# Patient Record
Sex: FEMALE | Race: WHITE | NOT HISPANIC OR LATINO | Employment: FULL TIME | URBAN - METROPOLITAN AREA
[De-identification: names, ages, dates, MRNs, and addresses within clinical notes are randomized per-mention and may not be internally consistent; named-entity substitution may affect disease eponyms.]

---

## 2017-01-10 ENCOUNTER — OFFICE VISIT (OUTPATIENT)
Dept: SURGERY | Facility: CLINIC | Age: 52
End: 2017-01-10

## 2017-01-10 VITALS
HEIGHT: 62 IN | DIASTOLIC BLOOD PRESSURE: 60 MMHG | WEIGHT: 129.5 LBS | BODY MASS INDEX: 23.83 KG/M2 | OXYGEN SATURATION: 98 % | HEART RATE: 68 BPM | SYSTOLIC BLOOD PRESSURE: 100 MMHG

## 2017-01-10 DIAGNOSIS — Z48.89 POSTOPERATIVE VISIT: Primary | ICD-10-CM

## 2017-01-10 PROCEDURE — 99024 POSTOP FOLLOW-UP VISIT: CPT | Performed by: SURGERY

## 2017-01-10 RX ORDER — ONDANSETRON 4 MG/1
4 TABLET, FILM COATED ORAL EVERY 6 HOURS PRN
Qty: 20 TABLET | Refills: 0 | Status: SHIPPED | OUTPATIENT
Start: 2017-01-10 | End: 2022-03-01

## 2017-01-10 RX ORDER — ESCITALOPRAM OXALATE 10 MG/1
20 TABLET ORAL DAILY
COMMUNITY
Start: 2017-01-08

## 2017-01-10 NOTE — MR AVS SNAPSHOT
Twila Allison   1/10/2017 2:20 PM   Office Visit    Dept Phone:  [phone]   Encounter #:  [id number]    Provider:  Sebastian Hernandez Jr., MD   Department:  [facility] GENERAL SURGERY                Your Full Care Plan              Today's Medication Changes          These changes are accurate as of: 1/10/17  4:23 PM.  If you have any questions, ask your nurse or doctor.               New Medication(s)Ordered:     ondansetron 4 MG tablet   Commonly known as:  ZOFRAN   Take 1 tablet by mouth Every 6 (Six) Hours As Needed for nausea or vomiting for up to 20 doses.   Started by:  Sebasitan Hernandez Jr., MD         Stop taking medication(s)listed here:     azithromycin 250 MG tablet   Commonly known as:  ZITHROMAX   Stopped by:  Sebastian Hernandez Jr., MD           Ibuprofen 200 MG capsule   Stopped by:  Sebastian Hernandez Jr., MD           NYQUIL PO   Stopped by:  Sebastian Hernandez Jr., MD           oxyCODONE-acetaminophen 5-325 MG per tablet   Commonly known as:  PERCOCET   Stopped by:  Sebastian Hernandez Jr., MD                Where to Get Your Medications      These medications were sent to [facility]/pharmacy #[id number] - [facility], [place] - [address] AT [facility] [phone] [facility] [phone]   [address], [facility] [address]     Phone:  [phone]     ondansetron 4 MG tablet                  Your Updated Medication List          This list is accurate as of: 1/10/17  4:23 PM.  Always use your most recent med list.                escitalopram 10 MG tablet   Commonly known as:  LEXAPRO       ondansetron 4 MG tablet   Commonly known as:  ZOFRAN   Take 1 tablet by mouth Every 6 (Six) Hours As Needed for nausea or vomiting for up to 20 doses.               You Were Diagnosed With        Codes Comments    Postoperative visit    -  Primary ICD-10-CM: Z48.89  ICD-9-CM: V58.49       Instructions     None    Patient Instructions History      Upcoming Appointments   "   Visit Type Date Time Department    POST-OP 1/10/2017  2:20 PM [facility]      [employer] Signup     [facility] [employer] allows you to send messages to your doctor, view your test results, renew your prescriptions, schedule appointments, and more. To sign up, go to [employer] and click on the Sign Up Now link in the New User? box. Enter your [employer] Activation Code exactly as it appears below along with the last four digits of your Social Security Number and your Date of Birth () to complete the sign-up process. If you do not sign up before the expiration date, you must request a new code.    [employer] Activation Code: [email or web address]  Expires: 2017  5:43 AM    If you have questions, you can email [email or web address] or call [phone] to talk to our [employer] staff. Remember, [employer] is NOT to be used for urgent needs. For medical emergencies, dial 911.               Other Info from Your Visit           Allergies     Demerol [Meperidine] Intolerance GI Intolerance      Reason for Visit     Post-op appendectomy      Vital Signs     Blood Pressure Pulse Height Weight Oxygen Saturation Body Mass Index    100/60 68 62\" (157.5 cm) 129 lb 8 oz (58.7 kg) 98% 23.69 kg/m2    Smoking Status                   Never Smoker           Problems and Diagnoses Noted     Postoperative visit    -  Primary        "

## 2017-01-10 NOTE — PROGRESS NOTES
Subjective   Twila Allison is a 51 y.o. female who returns to the office after undergoing a laparoscopic appendectomy.     History of Present Illness     The patient is recovering well with no significant postop symptoms.  She is having no abdominal pain.  She has a good appetite and normal bowel function.  Her energy level is good.      Review of Systems   Constitutional: Negative for fatigue and fever.   Gastrointestinal: Negative for abdominal pain, constipation, diarrhea and nausea.   Skin: Negative for rash and wound.       Objective   Physical Exam   Constitutional:  Non-toxic appearance. She does not appear ill. No distress.   Abdominal: Soft. Normal appearance. There is no tenderness.   Neurological: She is alert.   Skin:   Incision: intact with no evidence of infection.   Psychiatric: She has a normal mood and affect. Her behavior is normal.       Assessment/Plan   The encounter diagnosis was Postoperative visit.  The patient is recovering well from her laparoscopic appendectomy.  At this point she has no limitations. She will follow-up on an as needed basis.

## 2018-02-19 ENCOUNTER — APPOINTMENT (OUTPATIENT)
Dept: WOMENS IMAGING | Facility: HOSPITAL | Age: 53
End: 2018-02-19

## 2018-02-19 PROCEDURE — 77067 SCR MAMMO BI INCL CAD: CPT | Performed by: RADIOLOGY

## 2018-03-24 DIAGNOSIS — Z12.11 ENCOUNTER FOR SCREENING FOR MALIGNANT NEOPLASM OF COLON: Primary | ICD-10-CM

## 2018-03-24 RX ORDER — SODIUM CHLORIDE, SODIUM LACTATE, POTASSIUM CHLORIDE, CALCIUM CHLORIDE 600; 310; 30; 20 MG/100ML; MG/100ML; MG/100ML; MG/100ML
30 INJECTION, SOLUTION INTRAVENOUS CONTINUOUS
Status: CANCELLED | OUTPATIENT
Start: 2018-04-13

## 2018-04-02 PROBLEM — Z12.11 ENCOUNTER FOR SCREENING FOR MALIGNANT NEOPLASM OF COLON: Status: ACTIVE | Noted: 2018-04-02

## 2018-04-13 ENCOUNTER — ANESTHESIA EVENT (OUTPATIENT)
Dept: GASTROENTEROLOGY | Facility: HOSPITAL | Age: 53
End: 2018-04-13

## 2018-04-13 ENCOUNTER — ANESTHESIA (OUTPATIENT)
Dept: GASTROENTEROLOGY | Facility: HOSPITAL | Age: 53
End: 2018-04-13

## 2018-04-13 ENCOUNTER — HOSPITAL ENCOUNTER (OUTPATIENT)
Facility: HOSPITAL | Age: 53
Setting detail: HOSPITAL OUTPATIENT SURGERY
Discharge: HOME | End: 2018-04-13
Attending: INTERNAL MEDICINE | Admitting: INTERNAL MEDICINE

## 2018-04-13 VITALS
WEIGHT: 128.5 LBS | BODY MASS INDEX: 23.65 KG/M2 | RESPIRATION RATE: 16 BRPM | OXYGEN SATURATION: 100 % | TEMPERATURE: 97.6 F | SYSTOLIC BLOOD PRESSURE: 126 MMHG | HEART RATE: 58 BPM | DIASTOLIC BLOOD PRESSURE: 78 MMHG | HEIGHT: 62 IN

## 2018-04-13 DIAGNOSIS — Z12.11 ENCOUNTER FOR SCREENING FOR MALIGNANT NEOPLASM OF COLON: ICD-10-CM

## 2018-04-13 PROCEDURE — 88305 TISSUE EXAM BY PATHOLOGIST: CPT | Performed by: INTERNAL MEDICINE

## 2018-04-13 PROCEDURE — 45380 COLONOSCOPY AND BIOPSY: CPT | Performed by: INTERNAL MEDICINE

## 2018-04-13 PROCEDURE — 25010000002 PROPOFOL 10 MG/ML EMULSION: Performed by: ANESTHESIOLOGY

## 2018-04-13 PROCEDURE — S0260 H&P FOR SURGERY: HCPCS | Performed by: INTERNAL MEDICINE

## 2018-04-13 RX ORDER — LIDOCAINE HYDROCHLORIDE 20 MG/ML
INJECTION, SOLUTION INFILTRATION; PERINEURAL AS NEEDED
Status: DISCONTINUED | OUTPATIENT
Start: 2018-04-13 | End: 2018-04-13 | Stop reason: SURG

## 2018-04-13 RX ORDER — PROPOFOL 10 MG/ML
VIAL (ML) INTRAVENOUS CONTINUOUS PRN
Status: DISCONTINUED | OUTPATIENT
Start: 2018-04-13 | End: 2018-04-13 | Stop reason: SURG

## 2018-04-13 RX ORDER — PROPOFOL 10 MG/ML
VIAL (ML) INTRAVENOUS AS NEEDED
Status: DISCONTINUED | OUTPATIENT
Start: 2018-04-13 | End: 2018-04-13 | Stop reason: SURG

## 2018-04-13 RX ORDER — SODIUM CHLORIDE, SODIUM LACTATE, POTASSIUM CHLORIDE, CALCIUM CHLORIDE 600; 310; 30; 20 MG/100ML; MG/100ML; MG/100ML; MG/100ML
30 INJECTION, SOLUTION INTRAVENOUS CONTINUOUS
Status: DISCONTINUED | OUTPATIENT
Start: 2018-04-13 | End: 2018-04-13 | Stop reason: HOSPADM

## 2018-04-13 RX ADMIN — LIDOCAINE HYDROCHLORIDE 40 MG: 20 INJECTION, SOLUTION INFILTRATION; PERINEURAL at 08:39

## 2018-04-13 RX ADMIN — PROPOFOL 50 MG: 10 INJECTION, EMULSION INTRAVENOUS at 08:42

## 2018-04-13 RX ADMIN — PROPOFOL 140 MCG/KG/MIN: 10 INJECTION, EMULSION INTRAVENOUS at 08:43

## 2018-04-13 RX ADMIN — SODIUM CHLORIDE, POTASSIUM CHLORIDE, SODIUM LACTATE AND CALCIUM CHLORIDE: 600; 310; 30; 20 INJECTION, SOLUTION INTRAVENOUS at 08:37

## 2018-04-13 RX ADMIN — SODIUM CHLORIDE, POTASSIUM CHLORIDE, SODIUM LACTATE AND CALCIUM CHLORIDE 30 ML/HR: 600; 310; 30; 20 INJECTION, SOLUTION INTRAVENOUS at 08:03

## 2018-04-13 NOTE — H&P
"[facility]  Pre Procedure History & Physical    Chief Complaint: colon cancer screening      HPI: 54yo W with PMH as below here for screening colonoscopy.  No family history of GI malignancies nor colon polyps.  Denies blood in stool, change in bowel habits, abdominal pain.  Otherwise feels well.        Past Medical History:   Past Medical History:   Diagnosis Date   • Anxiety        Family History:  Family History   Problem Relation Age of Onset   • No Known Problems Mother    • Heart disease Father    • No Known Problems Daughter    • No Known Problems Daughter    • Malig Hyperthermia Neg Hx        Social History:   reports that she has never smoked. She has never used smokeless tobacco. She reports that she drinks about 1.8 oz of alcohol per week . Drug use questions deferred to the physician.    Medications:   Prescriptions Prior to Admission   Medication Sig Dispense Refill Last Dose   • escitalopram (LEXAPRO) 10 MG tablet Take 10 mg by mouth Daily.      • ondansetron (ZOFRAN) 4 MG tablet Take 1 tablet by mouth Every 6 (Six) Hours As Needed for nausea or vomiting for up to 20 doses. 20 tablet 0        Allergies:  Demerol [meperidine]    ROS:    Pertinent items are noted in HPI     Objective     Height 157.4 cm (61.97\").    Physical Exam   Constitutional: Pt is oriented to person, place, and time and well-developed, well-nourished, and in no distress.   HENT:   Mouth/Throat: Oropharynx is clear and moist.   Neck: Normal range of motion. Neck supple.   Cardiovascular: Normal rate, regular rhythm and normal heart sounds.    Pulmonary/Chest: Effort normal and breath sounds normal. No respiratory distress. No  wheezes.   Abdominal: Soft. Bowel sounds are normal.   Skin: Skin is warm and dry.   Psychiatric: Mood, memory, affect and judgment normal.     Assessment/Plan     Diagnosis: colon cancer screening        Anticipated Surgical Procedure:  Colonoscopy    The risks, benefits, and " alternatives of this procedure have been discussed with the patient or the responsible party- the patient understands and agrees to proceed.

## 2018-04-13 NOTE — ANESTHESIA PREPROCEDURE EVALUATION
Anesthesia Evaluation     Patient summary reviewed and Nursing notes reviewed                Airway   Mallampati: II  TM distance: >3 FB  Neck ROM: full  Dental - normal exam     Pulmonary - negative pulmonary ROS and normal exam   Cardiovascular - negative cardio ROS and normal exam        Neuro/Psych- negative ROS  GI/Hepatic/Renal/Endo - negative ROS     Musculoskeletal (-) negative ROS    Abdominal    Substance History - negative use     OB/GYN negative ob/gyn ROS         Other                        Anesthesia Plan    ASA 1     MAC     Anesthetic plan and risks discussed with patient.

## 2018-04-13 NOTE — ANESTHESIA POSTPROCEDURE EVALUATION
"Patient: Twila Allison    Procedure Summary     Date:  04/13/18 Room / Location:  [facility] ENDOSCOPY 9 /  [facility]    Anesthesia Start:  0837 Anesthesia Stop:  0913    Procedure:  COLONOSCOPY into cecum and TI with cold biopsy polypectomies (N/A ) Diagnosis:       Encounter for screening for malignant neoplasm of colon      (Encounter for screening for malignant neoplasm of colon [Z12.11])    Surgeon:  Roselyn Leung MD Provider:  Phillip Yanez MD    Anesthesia Type:  MAC ASA Status:  1          Anesthesia Type: MAC  Last vitals  BP   110/75 (04/13/18 0751)   Temp   36.8 °C (98.2 °F) (04/13/18 0751)   Pulse   66 (04/13/18 0751)   Resp   10 (04/13/18 0751)     SpO2   100 % (04/13/18 0751)     Post Anesthesia Care and Evaluation    Patient location during evaluation: bedside  Patient participation: complete - patient participated  Level of consciousness: awake  Pain score: 2  Pain management: adequate  Airway patency: patent  Anesthetic complications: No anesthetic complications  PONV Status: none  Cardiovascular status: acceptable  Respiratory status: acceptable  Hydration status: acceptable    Comments: /75 (BP Location: Left arm, Patient Position: Lying)   Pulse 66   Temp 36.8 °C (98.2 °F) (Oral)   Resp 10   Ht 157.5 cm (62\")   Wt 58.3 kg (128 lb 8 oz)   SpO2 100%   BMI 23.50 kg/m²         "

## 2018-04-16 LAB
CYTO UR: NORMAL
LAB AP CASE REPORT: NORMAL
Lab: NORMAL
PATH REPORT.FINAL DX SPEC: NORMAL
PATH REPORT.GROSS SPEC: NORMAL

## 2018-04-17 NOTE — PROGRESS NOTES
The 2 polyps removed from her colon were tubular adenomas.  In absence of symptoms, her next colonoscopy should be in 5 years.  Please place in recall.

## 2018-05-17 ENCOUNTER — TELEPHONE (OUTPATIENT)
Dept: GASTROENTEROLOGY | Facility: CLINIC | Age: 53
End: 2018-05-17

## 2018-05-17 NOTE — TELEPHONE ENCOUNTER
----- Message from Roselyn Leung MD sent at 4/17/2018  1:19 PM EDT -----  The 2 polyps removed from her colon were tubular adenomas.  In absence of symptoms, her next colonoscopy should be in 5 years.  Please place in recall.

## 2018-05-18 NOTE — TELEPHONE ENCOUNTER
Called pt and advised per Dr Leung that her 2 polyps removed were not cancerous but were precancerous tubular adenomas.  In the absence of symptoms , she recommends a repeat c/s in 5 yrs. Pt verb understanding.

## 2020-01-24 ENCOUNTER — APPOINTMENT (OUTPATIENT)
Dept: WOMENS IMAGING | Facility: HOSPITAL | Age: 55
End: 2020-01-24

## 2020-01-24 PROCEDURE — 77067 SCR MAMMO BI INCL CAD: CPT | Performed by: RADIOLOGY

## 2022-03-01 ENCOUNTER — OFFICE VISIT (OUTPATIENT)
Dept: FAMILY MEDICINE CLINIC | Facility: CLINIC | Age: 57
End: 2022-03-01

## 2022-03-01 VITALS
TEMPERATURE: 98.4 F | DIASTOLIC BLOOD PRESSURE: 76 MMHG | HEART RATE: 86 BPM | OXYGEN SATURATION: 96 % | HEIGHT: 62 IN | SYSTOLIC BLOOD PRESSURE: 126 MMHG | WEIGHT: 139.6 LBS | BODY MASS INDEX: 25.69 KG/M2

## 2022-03-01 DIAGNOSIS — Z13.220 ENCOUNTER FOR LIPID SCREENING FOR CARDIOVASCULAR DISEASE: ICD-10-CM

## 2022-03-01 DIAGNOSIS — Z13.6 ENCOUNTER FOR LIPID SCREENING FOR CARDIOVASCULAR DISEASE: ICD-10-CM

## 2022-03-01 DIAGNOSIS — F41.9 ANXIETY: ICD-10-CM

## 2022-03-01 DIAGNOSIS — K64.9 HEMORRHOIDS, UNSPECIFIED HEMORRHOID TYPE: ICD-10-CM

## 2022-03-01 DIAGNOSIS — K92.1 BLOOD IN STOOL: ICD-10-CM

## 2022-03-01 DIAGNOSIS — Z00.00 ENCOUNTER FOR ANNUAL PHYSICAL EXAM: Primary | ICD-10-CM

## 2022-03-01 DIAGNOSIS — K59.00 CONSTIPATION, UNSPECIFIED CONSTIPATION TYPE: ICD-10-CM

## 2022-03-01 PROCEDURE — 99386 PREV VISIT NEW AGE 40-64: CPT | Performed by: NURSE PRACTITIONER

## 2022-03-01 RX ORDER — CHLORAL HYDRATE 500 MG
1000 CAPSULE ORAL
COMMUNITY

## 2022-03-01 RX ORDER — B-COMPLEX WITH VITAMIN C
1 TABLET ORAL DAILY
COMMUNITY

## 2022-03-01 RX ORDER — HYDROCORTISONE 25 MG/G
CREAM TOPICAL
Qty: 28 G | Refills: 0 | Status: SHIPPED | OUTPATIENT
Start: 2022-03-01

## 2022-03-01 NOTE — PROGRESS NOTES
Subjective   Twila Allison is a 57 y.o. female.     Chief Complaint   Patient presents with   • Hemorrhoids     getting worse, slight bleeding painful annoying    • Annual Exam       History of Present Illness   New patient, here to establish care; no previous PCP; has been seeing GYN at [facility]; patient presents for CPE with non-fasting labs; healthy diet; regular exercise, 3-4 days per week, does strength training; regular dental visits; last eye exam last week; no problems hearing; immunizations: declines Tdap today and will check with insurance regarding Shingrix; sees LOUISE Jose for female care; last PAP about 6 months ago; last mammo about 1.5 years ago; no family history of breast cancer; colonoscopy 2018, repeat in 5 years; no family history of colon cancer.     Also, c/o hemorrhoids; has had problems with hemorrhoids since had kids; symptoms have been getting worse over the last year and more painful; has had slight bleeding; has noted bright red blood with wiping; has tried multiple OTC treatments and home remedies and have not helped; has tried apple cider vinegar, olive oil; has had ongoing constipation; stools are hard; takes Dulcolax as needed if has not had BM for few days; has BM about every 3-4 days; has tried MiraLax in past; had colonoscopy in 2018 per Dr. Leung GI.    F/U anxiety: takes Escitalopram daily and works well; no problems with sleep typically; no SI/HI.    The following portions of the patient's history were reviewed and updated as appropriate: allergies, current medications, past family history, past medical history, past social history, past surgical history and problem list.    Current Outpatient Medications on File Prior to Visit   Medication Sig   • B Complex Vitamins (Vitamin B Complex) tablet Take 1 tablet by mouth Daily.   • escitalopram (LEXAPRO) 10 MG tablet Take 10 mg by mouth Daily.   • Omega-3 Fatty Acids (fish oil) 1000 MG capsule capsule  Take 1,000 mg by mouth Daily With Breakfast.     No current facility-administered medications on file prior to visit.        Past Medical History:   Diagnosis Date   • Acute appendicitis with localized peritonitis 12/24/2016   • Anxiety        Past Surgical History:   Procedure Laterality Date   • APPENDECTOMY N/A 12/24/2016    Procedure: APPENDECTOMY LAPAROSCOPIC;  Surgeon: Sebastian Hernandez Jr., MD;  Location: [facility] MAIN OR;  Service:    • CHOLECYSTECTOMY  1999   • COLONOSCOPY N/A 4/13/2018    Procedure: COLONOSCOPY into cecum and TI with cold biopsy polypectomies;  Surgeon: Roselyn Leung MD;  Location: [facility] ENDOSCOPY;  Service: Gastroenterology   • LASIK  02/25/2022   • WRIST FUSION Left     2021       Family History   Problem Relation Age of Onset   • No Known Problems Mother    • Heart disease Father    • Heart attack Father 65        father passed in 2021 at age 81 years   • No Known Problems Daughter    • No Known Problems Daughter    • Pancreatic cancer Brother    • Lung cancer Brother    • Malig Hyperthermia Neg Hx        Social History     Socioeconomic History   • Marital status: Single   Tobacco Use   • Smoking status: Never Smoker   • Smokeless tobacco: Never Used   Vaping Use   • Vaping Use: Never used   Substance and Sexual Activity   • Alcohol use: Yes     Alcohol/week: 3.0 standard drinks     Types: 3 Cans of beer per week     Comment: a few times a year   • Drug use: Defer   • Sexual activity: Defer       Review of Systems   Constitutional: Negative for appetite change, chills, fatigue, fever and unexpected weight loss. Weight gain: has gained 15 pounds in the last year, no change in diet or exercise.   HENT: Negative for ear pain, sore throat and trouble swallowing. Sinus pressure: at times; takes OTC sinus med as needed and helps.    Eyes: Negative for blurred vision.   Respiratory: Negative for cough, chest tightness and shortness of breath.    Cardiovascular: Negative for chest pain,  "palpitations and leg swelling.   Gastrointestinal: Positive for blood in stool and constipation. Negative for abdominal pain, GERD and indigestion.   Endocrine: Negative for cold intolerance, heat intolerance and polydipsia.   Genitourinary: Negative for dysuria and frequency.   Musculoskeletal: Negative for arthralgias and back pain.   Skin: Negative for rash and skin lesions.   Neurological: Negative for dizziness, syncope, light-headedness and headache.   Hematological: Does not bruise/bleed easily.   Psychiatric/Behavioral: Negative for suicidal ideas.       Objective   Vitals:    03/01/22 1515   BP: 126/76   BP Location: Left arm   Patient Position: Sitting   Cuff Size: Adult   Pulse: 86   Temp: 98.4 °F (36.9 °C)   SpO2: 96%   Weight: 63.3 kg (139 lb 9.6 oz)   Height: 157.5 cm (62\")     Body mass index is 25.53 kg/m².    Physical Exam  Vitals and nursing note reviewed.   Constitutional:       General: She is not in acute distress.     Appearance: She is well-developed and well-groomed. She is not ill-appearing or diaphoretic.   HENT:      Head: Normocephalic and atraumatic.      Jaw: No tenderness or pain on movement.      Right Ear: Tympanic membrane and external ear normal. No decreased hearing noted.      Left Ear: Tympanic membrane and external ear normal. No decreased hearing noted.      Nose: Nose normal.      Right Sinus: No maxillary sinus tenderness or frontal sinus tenderness.      Left Sinus: No maxillary sinus tenderness or frontal sinus tenderness.      Mouth/Throat:      Mouth: Mucous membranes are moist.      Pharynx: No oropharyngeal exudate or posterior oropharyngeal erythema.   Eyes:      Extraocular Movements: Extraocular movements intact.      Conjunctiva/sclera: Conjunctivae normal.      Pupils: Pupils are equal, round, and reactive to light.   Neck:      Thyroid: No thyromegaly.      Vascular: No carotid bruit.      Trachea: No tracheal deviation.   Cardiovascular:      Rate and Rhythm: " Normal rate and regular rhythm.      Pulses: Normal pulses.      Heart sounds: Normal heart sounds. No murmur heard.      Pulmonary:      Effort: Pulmonary effort is normal. No respiratory distress.      Breath sounds: Normal breath sounds.   Abdominal:      General: Bowel sounds are normal.      Palpations: Abdomen is soft. There is no hepatomegaly or splenomegaly.      Tenderness: There is no abdominal tenderness. There is no guarding.   Genitourinary:     Comments: Pt declined exam of anus; would like referral.  Musculoskeletal:         General: Normal range of motion.      Cervical back: Normal range of motion and neck supple. No bony tenderness.      Thoracic back: No bony tenderness.      Lumbar back: No bony tenderness.      Right lower leg: No edema.      Left lower leg: No edema.   Lymphadenopathy:      Cervical: No cervical adenopathy.   Skin:     General: Skin is warm and dry.      Findings: No rash.   Neurological:      Mental Status: She is alert and oriented to person, place, and time.      Cranial Nerves: No cranial nerve deficit.      Motor: Motor function is intact.      Coordination: Coordination normal.      Gait: Gait normal.      Deep Tendon Reflexes: Reflexes are normal and symmetric.   Psychiatric:         Mood and Affect: Mood normal.         Behavior: Behavior normal.         Thought Content: Thought content normal.         Cognition and Memory: Cognition normal.         Judgment: Judgment normal.         Lab Results   Component Value Date    WBC 9.82 12/25/2016    RBC 3.90 12/25/2016    HGB 11.7 (L) 12/25/2016    HCT 38.1 12/25/2016    MCV 97.7 12/25/2016    MCH 30.0 12/25/2016    MCHC 30.7 (L) 12/25/2016    RDW 13.0 12/25/2016    RDWSD 46.3 12/25/2016    MPV 11.2 12/25/2016     12/25/2016    NEUTRORELPCT 85.7 (H) 12/25/2016    LYMPHORELPCT 8.2 (L) 12/25/2016    MONORELPCT 5.8 12/25/2016    EOSRELPCT 0.0 (L) 12/25/2016    BASORELPCT 0.1 12/25/2016    AUTOIGPER 0.2 12/25/2016     NEUTROABS 8.41 (H) 12/25/2016    LYMPHSABS 0.81 (L) 12/25/2016    MONOSABS 0.57 12/25/2016    EOSABS 0.00 12/25/2016    BASOSABS 0.01 12/25/2016    AUTOIGNUM 0.02 12/25/2016     Lab Results   Component Value Date    GLUCOSE 143 (H) 12/25/2016    BUN 7 12/25/2016    CREATININE 0.61 12/25/2016    EGFRIFNONA 104 12/25/2016    BCR 11.5 12/25/2016    K 4.3 12/25/2016    CO2 26.0 12/25/2016    CALCIUM 9.1 12/25/2016    ALBUMIN 4.20 12/24/2016    AST 44 (H) 12/24/2016    ALT 43 (H) 12/24/2016      Lab Results   Component Value Date    CHLPL 138 02/17/2014    TRIG 50 02/17/2014    HDL 69 02/17/2014    VLDL 10 02/17/2014    LDL 60 02/17/2014     Lab Results   Component Value Date    TSH 1.26 02/17/2014     Lab Results   Component Value Date    RBCUA 3-5 (A) 12/24/2016    BACTERIA None Seen 12/24/2016    COLORU Yellow 12/24/2016    CLARITYU Clear 12/24/2016    LEUKOCYTESUR Negative 12/24/2016    GLUCOSEU Negative 12/24/2016    BLOODU Negative 12/24/2016    BILIRUBINUR Negative 12/24/2016    NITRITEU Negative 12/24/2016        Assessment/Plan .  Problem List Items Addressed This Visit     Anxiety    Current Assessment & Plan     Stable on Escitalopram daily.  Managed per GYN.         Hemorrhoids    Relevant Medications    Hydrocortisone, Perianal, (ANUSOL-HC) 2.5 % rectal cream    Other Relevant Orders    Ambulatory Referral to Colorectal Surgery    Constipation    Current Assessment & Plan     Continue increased intake of clear liquids and fiber.  Consider MiraLax daily until loose bowel movements.         Relevant Orders    Comprehensive Metabolic Panel    TSH Rfx On Abnormal To Free T4    Blood in stool    Relevant Orders    CBC & Differential    Ambulatory Referral to Colorectal Surgery      Other Visit Diagnoses     Encounter for annual physical exam    -  Primary    Relevant Orders    CBC & Differential    Lipid Panel With LDL / HDL Ratio    Comprehensive Metabolic Panel    TSH Rfx On Abnormal To Free T4    Encounter for  lipid screening for cardiovascular disease        Relevant Orders    Lipid Panel With LDL / HDL Ratio          Return if symptoms worsen or fail to improve.  Impression: Health maintenance visit.  Currently, eats a healthy diet and has a regular exercise routine.  Cervical cancer screening: see GYN.  Breast cancer screening: due for repeat mammo at GYN.  Colorectal cancer screening: UTD.  Screening lab work includes: CMP, lipid.  Immunizations: UTD; risks and benefits of immunizations were discussed with patient.  Advice and education were given regarding nutrition and aerobic exercise.         COVID-19 Precautions - Patient was compliant in wearing a mask. When I saw the patient, I used appropriate personal protective equipment (PPE) including mask, gloves, and eye shield (standard procedure).  Hand hygiene was completed before and after seeing the patient.   Cimetidine Pregnancy And Lactation Text: This medication is Pregnancy Category B and is considered safe during pregnancy. It is also excreted in breast milk and breast feeding isn't recommended.

## 2022-03-01 NOTE — PATIENT INSTRUCTIONS
Continue healthy diet and exercise.  Follow up pending lab results.  Follow up if symptoms persist or worsen.

## 2022-03-02 PROBLEM — K59.00 CONSTIPATION: Status: ACTIVE | Noted: 2022-03-02

## 2022-03-02 PROBLEM — K64.9 HEMORRHOIDS: Status: ACTIVE | Noted: 2022-03-02

## 2022-03-02 PROBLEM — K92.1 BLOOD IN STOOL: Status: ACTIVE | Noted: 2022-03-02

## 2022-03-02 LAB
ALBUMIN SERPL-MCNC: 4.6 G/DL (ref 3.8–4.9)
ALBUMIN/GLOB SERPL: 1.8 {RATIO} (ref 1.2–2.2)
ALP SERPL-CCNC: 87 IU/L (ref 44–121)
ALT SERPL-CCNC: 22 IU/L (ref 0–32)
AST SERPL-CCNC: 25 IU/L (ref 0–40)
BASOPHILS # BLD AUTO: 0.1 X10E3/UL (ref 0–0.2)
BASOPHILS NFR BLD AUTO: 1 %
BILIRUB SERPL-MCNC: 0.8 MG/DL (ref 0–1.2)
BUN SERPL-MCNC: 12 MG/DL (ref 6–24)
BUN/CREAT SERPL: 14 (ref 9–23)
CALCIUM SERPL-MCNC: 9.8 MG/DL (ref 8.7–10.2)
CHLORIDE SERPL-SCNC: 101 MMOL/L (ref 96–106)
CHOLEST SERPL-MCNC: 214 MG/DL (ref 100–199)
CO2 SERPL-SCNC: 24 MMOL/L (ref 20–29)
CREAT SERPL-MCNC: 0.84 MG/DL (ref 0.57–1)
EGFR GENE MUT ANL BLD/T: 82 ML/MIN/1.73
EOSINOPHIL # BLD AUTO: 0.1 X10E3/UL (ref 0–0.4)
EOSINOPHIL NFR BLD AUTO: 2 %
ERYTHROCYTE [DISTWIDTH] IN BLOOD BY AUTOMATED COUNT: 12.2 % (ref 11.7–15.4)
GLOBULIN SER CALC-MCNC: 2.5 G/DL (ref 1.5–4.5)
GLUCOSE SERPL-MCNC: 86 MG/DL (ref 65–99)
HCT VFR BLD AUTO: 40.6 % (ref 34–46.6)
HDLC SERPL-MCNC: 103 MG/DL
HGB BLD-MCNC: 13.4 G/DL (ref 11.1–15.9)
IMM GRANULOCYTES # BLD AUTO: 0 X10E3/UL (ref 0–0.1)
IMM GRANULOCYTES NFR BLD AUTO: 0 %
LDLC SERPL CALC-MCNC: 103 MG/DL (ref 0–99)
LDLC/HDLC SERPL: 1 RATIO (ref 0–3.2)
LYMPHOCYTES # BLD AUTO: 2 X10E3/UL (ref 0.7–3.1)
LYMPHOCYTES NFR BLD AUTO: 35 %
MCH RBC QN AUTO: 29.9 PG (ref 26.6–33)
MCHC RBC AUTO-ENTMCNC: 33 G/DL (ref 31.5–35.7)
MCV RBC AUTO: 91 FL (ref 79–97)
MONOCYTES # BLD AUTO: 0.5 X10E3/UL (ref 0.1–0.9)
MONOCYTES NFR BLD AUTO: 8 %
NEUTROPHILS # BLD AUTO: 3.2 X10E3/UL (ref 1.4–7)
NEUTROPHILS NFR BLD AUTO: 54 %
PLATELET # BLD AUTO: 295 X10E3/UL (ref 150–450)
POTASSIUM SERPL-SCNC: 4.6 MMOL/L (ref 3.5–5.2)
PROT SERPL-MCNC: 7.1 G/DL (ref 6–8.5)
RBC # BLD AUTO: 4.48 X10E6/UL (ref 3.77–5.28)
SODIUM SERPL-SCNC: 140 MMOL/L (ref 134–144)
TRIGL SERPL-MCNC: 45 MG/DL (ref 0–149)
TSH SERPL DL<=0.005 MIU/L-ACNC: 1.97 UIU/ML (ref 0.45–4.5)
VLDLC SERPL CALC-MCNC: 8 MG/DL (ref 5–40)
WBC # BLD AUTO: 5.9 X10E3/UL (ref 3.4–10.8)

## 2022-03-02 NOTE — ASSESSMENT & PLAN NOTE
Continue increased intake of clear liquids and fiber.  Consider MiraLax daily until loose bowel movements.

## 2022-03-07 ENCOUNTER — OFFICE VISIT (OUTPATIENT)
Dept: SURGERY | Facility: CLINIC | Age: 57
End: 2022-03-07

## 2022-03-07 VITALS
HEART RATE: 80 BPM | OXYGEN SATURATION: 98 % | DIASTOLIC BLOOD PRESSURE: 86 MMHG | WEIGHT: 135.2 LBS | SYSTOLIC BLOOD PRESSURE: 110 MMHG | BODY MASS INDEX: 24.88 KG/M2 | HEIGHT: 62 IN | TEMPERATURE: 97.4 F

## 2022-03-07 DIAGNOSIS — K64.4 ANAL SKIN TAG: Primary | ICD-10-CM

## 2022-03-07 DIAGNOSIS — K64.8 INTERNAL HEMORRHOIDS: ICD-10-CM

## 2022-03-07 PROCEDURE — 46600 DIAGNOSTIC ANOSCOPY SPX: CPT | Performed by: PHYSICIAN ASSISTANT

## 2022-03-07 PROCEDURE — 99214 OFFICE O/P EST MOD 30 MIN: CPT | Performed by: PHYSICIAN ASSISTANT

## 2022-03-07 NOTE — PROGRESS NOTES
Twila Allison is a 57 y.o. female who is seen as a consult at the request of LOUISE Norris for Hemorrhoids and Rectal Bleeding.      HPI:  Pt presents today for evaluation of hemorrhoids.   Intermittent flare ups for years. Worsening symptoms over the past year.   Burning sensation during workouts.   Occasional RB.  Was seen by her PCP 03/01/2022 and given Rx for Anusol-HC 2.5% rectal cream. Applying internally and externally without improvement.     Irregular BMs.   Chronic constipation   BM every 3-4 days. Normal per Pt  [place]: 1  Straining with BMs  Increased PO hydration and tries to eat high fiber diet  Tried taking fiber supplements and Miralax in the past without improvement.   Denies trying Linzess, Amitiza, or Trulance in the past.     Last colonoscopy in 2018. Tubular adenomas x2.   No known FHx of colon polyps or colon cancer.     Past Medical History:   Diagnosis Date   • Acute appendicitis with localized peritonitis 12/24/2016   • Anxiety        Past Surgical History:   Procedure Laterality Date   • APPENDECTOMY N/A 12/24/2016    Procedure: APPENDECTOMY LAPAROSCOPIC;  Surgeon: Sebastian Hernandez Jr., MD;  Location: [facility] MAIN OR;  Service:    • CHOLECYSTECTOMY N/A 1999   • COLONOSCOPY N/A 04/13/2018    Procedure: COLONOSCOPY into cecum and TI with cold biopsy polypectomies;  Surgeon: Roselyn Leung MD;  Location: [facility] ENDOSCOPY;  Service: Gastroenterology   • LASIK Bilateral 02/25/2022   • WRIST FUSION Left 2021       Social History:   reports that she has never smoked. She has never used smokeless tobacco. She reports current alcohol use of about 3.0 standard drinks of alcohol per week. Drug use questions deferred to the physician.      Marriage status: Single    Family History   Problem Relation Age of Onset   • No Known Problems Mother    • Hypertension Father    • Heart disease Father    • Heart attack Father 65        father passed in 2021 at age 81 years   • Cancer Brother    •  Pancreatic cancer Brother    • Cancer Brother    • Lung cancer Brother    • No Known Problems Daughter    • No Known Problems Daughter    • Malig Hyperthermia Neg Hx          Current Outpatient Medications:   •  escitalopram (LEXAPRO) 10 MG tablet, Take 20 mg by mouth Daily., Disp: , Rfl:   •  B Complex Vitamins (Vitamin B Complex) tablet, Take 1 tablet by mouth Daily., Disp: , Rfl:   •  Hydrocortisone, Perianal, (ANUSOL-HC) 2.5 % rectal cream, Apply externally to hemorrhoids twice daily ., Disp: 28 g, Rfl: 0  •  linaclotide (Linzess) 145 MCG capsule capsule, Take 1 capsule by mouth Every Morning Before Breakfast., Disp: 30 capsule, Rfl: 3  •  Omega-3 Fatty Acids (fish oil) 1000 MG capsule capsule, Take 1,000 mg by mouth Daily With Breakfast., Disp: , Rfl:     Allergy  Demerol [meperidine]    Review of Systems   Constitutional: Negative for decreased appetite and weight gain.   HENT: Negative for congestion, hearing loss and hoarse voice.    Eyes: Negative for blurred vision, discharge and visual disturbance.   Cardiovascular: Negative for chest pain, cyanosis and leg swelling.   Respiratory: Negative for cough, shortness of breath, sleep disturbances due to breathing and snoring.    Endocrine: Negative for cold intolerance and heat intolerance.   Hematologic/Lymphatic: Does not bruise/bleed easily.   Skin: Negative for itching, poor wound healing and skin cancer.   Musculoskeletal: Negative for arthritis, back pain, joint pain and joint swelling.   Gastrointestinal: Negative for abdominal pain, change in bowel habit, bowel incontinence and constipation.   Genitourinary: Negative for bladder incontinence, dysuria and hematuria.   Neurological: Negative for brief paralysis, excessive daytime sleepiness, dizziness, focal weakness, headaches, light-headedness and weakness.   Psychiatric/Behavioral: Negative for altered mental status and hallucinations. The patient does not have insomnia.    Allergic/Immunologic:  Negative for HIV exposure and persistent infections.   All other systems reviewed and are negative.      Vitals:    03/07/22 1034   BP: 110/86   Pulse: 80   Temp: 97.4 °F (36.3 °C)   SpO2: 98%     Body mass index is 24.73 kg/m².    Physical Exam  Exam conducted with a chaperone present.   Constitutional:       General: She is not in acute distress.     Appearance: She is well-developed.   HENT:      Head: Normocephalic and atraumatic.      Nose: Nose normal.   Eyes:      Conjunctiva/sclera: Conjunctivae normal.      Pupils: Pupils are equal, round, and reactive to light.   Neck:      Trachea: No tracheal deviation.   Pulmonary:      Effort: Pulmonary effort is normal. No respiratory distress.      Breath sounds: Normal breath sounds.   Abdominal:      General: Bowel sounds are normal. There is no distension.      Palpations: Abdomen is soft.   Genitourinary:     Comments: Perianal exam: Anal skin tags  FABIAN- Good tone, no masses  Anoscopy performed:  Grade 2 x 2 internal hem   Musculoskeletal:         General: No deformity. Normal range of motion.      Cervical back: Normal range of motion.   Skin:     General: Skin is warm and dry.   Neurological:      Mental Status: She is alert and oriented to person, place, and time.      Cranial Nerves: No cranial nerve deficit.      Coordination: Coordination normal.      Gait: Gait normal.   Psychiatric:         Behavior: Behavior normal.         Judgment: Judgment normal.         Review of Medical Record:  I reviewed PMHx, Surgical Hx, FHx, and Medication List    Colonoscopy 04/13/2018  - Hemorrhoids on perianal exam  - Non-Bleeding Internal Hemorrhoids  - 3 mm Tubular Adenoma with Low-Grade Dysplasia, Cecum  - 4 mm Tubular Adenoma with Low-Grade Dysplasia, Ascending Colon  - Diverticulosis, Sigmoid  - Repeat 5 years  - Dr. Leung, [facility]    Assessment:  1. Anal skin tag    2. Internal hemorrhoids    - New     Plan:  - Discussed with Pt the limitations of removing anal skin  tags. The scar tissue post anal skin tag excision will likely be comparable in size to the current skin tags and therefore would not yield desirable results.  Pt expresses understanding.   - Discussed with Pt common causes of hemorrhoidal irritation and enlargement. Discussed with Pt that treating her constipation could prevent future hemorrhoidal irritation and thus prevent worsening of the anal skin tags.   - Start Linzess 145 mcg QD  - Continue Anusol-HC 2.5% rectal cream PRN for hemorrhoidal discomfort  - Follow up in 6-8 weeks for reevaluation

## 2022-03-08 ENCOUNTER — TELEPHONE (OUTPATIENT)
Dept: SURGERY | Facility: CLINIC | Age: 57
End: 2022-03-08

## 2022-03-08 NOTE — TELEPHONE ENCOUNTER
Pt says she was prescribed Linzess yesterday and it was sent to [facility], pt wants to know if it can be sent to [facility] in [facility]

## 2022-03-10 RX ORDER — LUBIPROSTONE 24 UG/1
24 CAPSULE ORAL 2 TIMES DAILY WITH MEALS
Qty: 60 CAPSULE | Refills: 0 | Status: SHIPPED | OUTPATIENT
Start: 2022-03-10 | End: 2022-03-14

## 2022-03-14 ENCOUNTER — TELEPHONE (OUTPATIENT)
Dept: SURGERY | Facility: CLINIC | Age: 57
End: 2022-03-14

## 2022-03-14 NOTE — TELEPHONE ENCOUNTER
My mistake, not cream it was medication. First prescribed Linzess 145mcg, was too expensive so she switched it to Amitiza 24mcg, that is also too expensive. Pt has tried and failed fiber and miralax. Has not tried amitiza, linzess, or trulence. Insurance usually lists colestipol as tier 1, she may have to try and fail that before insurance pays for another one?

## 2022-03-14 NOTE — TELEPHONE ENCOUNTER
Pt called last week because hydrocortisone cream that Marilee sent to pharmacy was too expensive, so she sent in generic cream. Pt says this cream is also too expensive, requesting alternative. Uses [facility] in [facility]

## 2022-03-15 ENCOUNTER — TELEPHONE (OUTPATIENT)
Dept: SURGERY | Facility: CLINIC | Age: 57
End: 2022-03-15

## 2022-03-15 NOTE — TELEPHONE ENCOUNTER
PT WENT TO PHARMACY AND SMALL DOSE LINZESS THAT WAS ORDERED YESTERDAY WAS STILL $300, WOULD LIKE ALTERNATIVE

## 2022-03-22 RX ORDER — COLESTIPOL HYDROCHLORIDE 5 G/5G
5 GRANULE, FOR SUSPENSION ORAL 2 TIMES DAILY
Qty: 300 G | Refills: 0 | Status: SHIPPED | OUTPATIENT
Start: 2022-03-22 | End: 2022-04-21

## 2022-08-31 ENCOUNTER — APPOINTMENT (OUTPATIENT)
Dept: WOMENS IMAGING | Facility: HOSPITAL | Age: 57
End: 2022-08-31

## 2022-08-31 PROCEDURE — 77063 BREAST TOMOSYNTHESIS BI: CPT | Performed by: RADIOLOGY

## 2022-08-31 PROCEDURE — 77067 SCR MAMMO BI INCL CAD: CPT | Performed by: RADIOLOGY

## 2022-11-17 ENCOUNTER — HOSPITAL ENCOUNTER (EMERGENCY)
Facility: HOSPITAL | Age: 57
Discharge: HOME | End: 2022-11-17
Attending: EMERGENCY MEDICINE | Admitting: EMERGENCY MEDICINE

## 2022-11-17 ENCOUNTER — APPOINTMENT (OUTPATIENT)
Dept: GENERAL RADIOLOGY | Facility: HOSPITAL | Age: 57
End: 2022-11-17

## 2022-11-17 ENCOUNTER — APPOINTMENT (OUTPATIENT)
Dept: CT IMAGING | Facility: HOSPITAL | Age: 57
End: 2022-11-17

## 2022-11-17 VITALS
BODY MASS INDEX: 24.84 KG/M2 | WEIGHT: 135 LBS | OXYGEN SATURATION: 97 % | SYSTOLIC BLOOD PRESSURE: 124 MMHG | DIASTOLIC BLOOD PRESSURE: 81 MMHG | RESPIRATION RATE: 20 BRPM | HEART RATE: 58 BPM | TEMPERATURE: 96.8 F | HEIGHT: 62 IN

## 2022-11-17 DIAGNOSIS — J18.9 COMMUNITY ACQUIRED PNEUMONIA OF LEFT LOWER LOBE OF LUNG: Primary | ICD-10-CM

## 2022-11-17 DIAGNOSIS — R07.9 CHEST PAIN, UNSPECIFIED TYPE: ICD-10-CM

## 2022-11-17 LAB
ALBUMIN SERPL-MCNC: 4.4 G/DL (ref 3.5–5.2)
ALBUMIN/GLOB SERPL: 1.8 G/DL
ALP SERPL-CCNC: 103 U/L (ref 39–117)
ALT SERPL W P-5'-P-CCNC: 28 U/L (ref 1–33)
ANION GAP SERPL CALCULATED.3IONS-SCNC: 10.6 MMOL/L (ref 5–15)
AST SERPL-CCNC: 29 U/L (ref 1–32)
BASOPHILS # BLD AUTO: 0.06 10*3/MM3 (ref 0–0.2)
BASOPHILS NFR BLD AUTO: 0.9 % (ref 0–1.5)
BILIRUB SERPL-MCNC: 1 MG/DL (ref 0–1.2)
BUN SERPL-MCNC: 12 MG/DL (ref 6–20)
BUN/CREAT SERPL: 15.8 (ref 7–25)
CALCIUM SPEC-SCNC: 9.5 MG/DL (ref 8.6–10.5)
CHLORIDE SERPL-SCNC: 104 MMOL/L (ref 98–107)
CO2 SERPL-SCNC: 26.4 MMOL/L (ref 22–29)
CREAT SERPL-MCNC: 0.76 MG/DL (ref 0.57–1)
D DIMER PPP FEU-MCNC: 0.65 MCGFEU/ML (ref 0–0.49)
DEPRECATED RDW RBC AUTO: 43.7 FL (ref 37–54)
EGFRCR SERPLBLD CKD-EPI 2021: 92.1 ML/MIN/1.73
EOSINOPHIL # BLD AUTO: 0.13 10*3/MM3 (ref 0–0.4)
EOSINOPHIL NFR BLD AUTO: 1.9 % (ref 0.3–6.2)
ERYTHROCYTE [DISTWIDTH] IN BLOOD BY AUTOMATED COUNT: 12.4 % (ref 12.3–15.4)
GLOBULIN UR ELPH-MCNC: 2.4 GM/DL
GLUCOSE SERPL-MCNC: 95 MG/DL (ref 65–99)
HCT VFR BLD AUTO: 39.7 % (ref 34–46.6)
HGB BLD-MCNC: 13 G/DL (ref 12–15.9)
IMM GRANULOCYTES # BLD AUTO: 0.01 10*3/MM3 (ref 0–0.05)
IMM GRANULOCYTES NFR BLD AUTO: 0.1 % (ref 0–0.5)
INR PPP: 0.98 (ref 0.9–1.1)
LYMPHOCYTES # BLD AUTO: 1.27 10*3/MM3 (ref 0.7–3.1)
LYMPHOCYTES NFR BLD AUTO: 18.8 % (ref 19.6–45.3)
MCH RBC QN AUTO: 30.8 PG (ref 26.6–33)
MCHC RBC AUTO-ENTMCNC: 32.7 G/DL (ref 31.5–35.7)
MCV RBC AUTO: 94.1 FL (ref 79–97)
MONOCYTES # BLD AUTO: 0.65 10*3/MM3 (ref 0.1–0.9)
MONOCYTES NFR BLD AUTO: 9.6 % (ref 5–12)
NEUTROPHILS NFR BLD AUTO: 4.64 10*3/MM3 (ref 1.7–7)
NEUTROPHILS NFR BLD AUTO: 68.7 % (ref 42.7–76)
NRBC BLD AUTO-RTO: 0 /100 WBC (ref 0–0.2)
PLATELET # BLD AUTO: 238 10*3/MM3 (ref 140–450)
PMV BLD AUTO: 10.5 FL (ref 6–12)
POTASSIUM SERPL-SCNC: 4.4 MMOL/L (ref 3.5–5.2)
PROT SERPL-MCNC: 6.8 G/DL (ref 6–8.5)
PROTHROMBIN TIME: 13.1 SECONDS (ref 11.7–14.2)
QT INTERVAL: 385 MS
RBC # BLD AUTO: 4.22 10*6/MM3 (ref 3.77–5.28)
SODIUM SERPL-SCNC: 141 MMOL/L (ref 136–145)
TROPONIN T SERPL-MCNC: <0.01 NG/ML (ref 0–0.03)
WBC NRBC COR # BLD: 6.76 10*3/MM3 (ref 3.4–10.8)

## 2022-11-17 PROCEDURE — 93005 ELECTROCARDIOGRAM TRACING: CPT

## 2022-11-17 PROCEDURE — 85610 PROTHROMBIN TIME: CPT | Performed by: EMERGENCY MEDICINE

## 2022-11-17 PROCEDURE — 71045 X-RAY EXAM CHEST 1 VIEW: CPT

## 2022-11-17 PROCEDURE — 85379 FIBRIN DEGRADATION QUANT: CPT | Performed by: EMERGENCY MEDICINE

## 2022-11-17 PROCEDURE — 93005 ELECTROCARDIOGRAM TRACING: CPT | Performed by: EMERGENCY MEDICINE

## 2022-11-17 PROCEDURE — 80053 COMPREHEN METABOLIC PANEL: CPT | Performed by: EMERGENCY MEDICINE

## 2022-11-17 PROCEDURE — 0 IOPAMIDOL PER 1 ML: Performed by: EMERGENCY MEDICINE

## 2022-11-17 PROCEDURE — 84484 ASSAY OF TROPONIN QUANT: CPT | Performed by: EMERGENCY MEDICINE

## 2022-11-17 PROCEDURE — 99283 EMERGENCY DEPT VISIT LOW MDM: CPT

## 2022-11-17 PROCEDURE — 85025 COMPLETE CBC W/AUTO DIFF WBC: CPT | Performed by: EMERGENCY MEDICINE

## 2022-11-17 PROCEDURE — 71275 CT ANGIOGRAPHY CHEST: CPT

## 2022-11-17 PROCEDURE — 93010 ELECTROCARDIOGRAM REPORT: CPT | Performed by: INTERNAL MEDICINE

## 2022-11-17 RX ORDER — AZITHROMYCIN 250 MG/1
500 TABLET, FILM COATED ORAL DAILY
Qty: 6 TABLET | Refills: 0 | Status: SHIPPED | OUTPATIENT
Start: 2022-11-17

## 2022-11-17 RX ADMIN — IOPAMIDOL 100 ML: 755 INJECTION, SOLUTION INTRAVENOUS at 08:52

## 2022-11-17 NOTE — ED PROVIDER NOTES
EMERGENCY DEPARTMENT ENCOUNTER    Room Number:  09/09  Date of encounter:  11/17/2022  PCP: Deidre Isaac APRN  Historian: Patient     I used full protective equipment while examining this patient.  This includes face mask, gloves and protective eyewear.  I washed my hands before entering the room and immediately upon leaving the room      HPI:  Chief Complaint: Chest pain, shortness of breath  A complete HPI/ROS/PMH/PSH/SH/FH are unobtainable due to: None    Context: Twila Allison is a 57 y.o. female who presents to the ED c/o chest pain, shortness of breath.  Pain began 2 days ago and is gradually increased.  Pain is in the left lateral chest and is worsened with movements or with deep breath.  Pain is dull at rest but becomes more sharp with movements.  Pain is currently moderate.  She does report mild shortness of breath.  Denies cough.  Denies fever.  Denies abdominal pain.  Patient states she has no chronic medical problems and takes no medications on a regular basis.  She is a non-smoker.  She does state that her father had heart disease in his 80s.  Denies risk factors for PE/DVT.  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      MEDICAL RECORD REVIEW  I reviewed prior medical records including most recent office visit from earlier this year.  Patient does have a history of anxiety, constipation and hemorrhoids    PAST MEDICAL HISTORY  Active Ambulatory Problems     Diagnosis Date Noted   • Encounter for screening for malignant neoplasm of colon 04/02/2018   • Anxiety    • Hemorrhoids 03/02/2022   • Constipation 03/02/2022   • Blood in stool 03/02/2022     Resolved Ambulatory Problems     Diagnosis Date Noted   • Acute appendicitis with localized peritonitis 12/24/2016     No Additional Past Medical History         PAST SURGICAL HISTORY  Past Surgical History:   Procedure Laterality Date   • APPENDECTOMY N/A 12/24/2016    Procedure: APPENDECTOMY LAPAROSCOPIC;  Surgeon: Sebastian  David Khanna MD;  Location: [facility] MAIN OR;  Service:    • CHOLECYSTECTOMY N/A 1999   • COLONOSCOPY N/A 04/13/2018    Procedure: COLONOSCOPY into cecum and TI with cold biopsy polypectomies;  Surgeon: Roselyn Leung MD;  Location: [facility] ENDOSCOPY;  Service: Gastroenterology   • LASIK Bilateral 02/25/2022   • WRIST FUSION Left 2021         FAMILY HISTORY  Family History   Problem Relation Age of Onset   • No Known Problems Mother    • Hypertension Father    • Heart disease Father    • Heart attack Father 65        father passed in 2021 at age 81 years   • Cancer Brother    • Pancreatic cancer Brother    • Cancer Brother    • Lung cancer Brother    • No Known Problems Daughter    • No Known Problems Daughter    • Malig Hyperthermia Neg Hx          SOCIAL HISTORY  Social History     Socioeconomic History   • Marital status: Single   Tobacco Use   • Smoking status: Never   • Smokeless tobacco: Never   Vaping Use   • Vaping Use: Never used   Substance and Sexual Activity   • Alcohol use: Yes     Alcohol/week: 3.0 standard drinks     Types: 3 Cans of beer per week     Comment: a few times a year   • Drug use: Defer   • Sexual activity: Defer     Birth control/protection: Post-menopausal         ALLERGIES  Demerol [meperidine]       REVIEW OF SYSTEMS  Review of Systems   Constitutional: Negative.  Negative for fever.   HENT: Negative.  Negative for sore throat.    Eyes: Negative.    Respiratory: Positive for shortness of breath. Negative for cough.    Cardiovascular: Positive for chest pain.   Gastrointestinal: Negative.    Genitourinary: Negative.  Negative for dysuria.   Musculoskeletal: Negative.  Negative for back pain.   Skin: Negative.  Negative for rash.   Neurological: Negative.  Negative for headaches.   All other systems reviewed and are negative.          PHYSICAL EXAM    I have reviewed the triage vital signs and nursing notes.    ED Triage Vitals [11/17/22 0656]   Temp Heart Rate Resp BP SpO2   96.8 °F (36  °C) 106 20 -- 92 %      Temp src Heart Rate Source Patient Position BP Location FiO2 (%)   Oral Monitor -- -- --       Physical Exam  GENERAL: Alert female in no obvious distress.  Triage vitals reviewed notable for initial pulse of 106 which is improved at bedside.  HENT: nares patent  EYES: no scleral icterus  CV: regular rhythm, regular rate-no murmur  RESPIRATORY: normal effort, clear to auscultation bilaterally-O2 sats upper 90s on room air  ABDOMEN: soft, nontender to palpation  MUSCULOSKELETAL: no deformity-no segment swelling or tenderness to palpation  NEURO: Strength sensation and coordination are grossly intact.  Speech and mentation are unremarkable  SKIN: warm, dry-no unusual rashes are noted      LAB RESULTS  Recent Results (from the past 24 hour(s))   ECG 12 Lead Chest Pain    Collection Time: 11/17/22  6:59 AM   Result Value Ref Range    QT Interval 385 ms   Comprehensive Metabolic Panel    Collection Time: 11/17/22  7:11 AM    Specimen: Blood   Result Value Ref Range    Glucose 95 65 - 99 mg/dL    BUN 12 6 - 20 mg/dL    Creatinine 0.76 0.57 - 1.00 mg/dL    Sodium 141 136 - 145 mmol/L    Potassium 4.4 3.5 - 5.2 mmol/L    Chloride 104 98 - 107 mmol/L    CO2 26.4 22.0 - 29.0 mmol/L    Calcium 9.5 8.6 - 10.5 mg/dL    Total Protein 6.8 6.0 - 8.5 g/dL    Albumin 4.40 3.50 - 5.20 g/dL    ALT (SGPT) 28 1 - 33 U/L    AST (SGOT) 29 1 - 32 U/L    Alkaline Phosphatase 103 39 - 117 U/L    Total Bilirubin 1.0 0.0 - 1.2 mg/dL    Globulin 2.4 gm/dL    A/G Ratio 1.8 g/dL    BUN/Creatinine Ratio 15.8 7.0 - 25.0    Anion Gap 10.6 5.0 - 15.0 mmol/L    eGFR 92.1 >60.0 mL/min/1.73   Protime-INR    Collection Time: 11/17/22  7:11 AM    Specimen: Blood   Result Value Ref Range    Protime 13.1 11.7 - 14.2 Seconds    INR 0.98 0.90 - 1.10   D-dimer, Quantitative    Collection Time: 11/17/22  7:11 AM    Specimen: Blood   Result Value Ref Range    D-Dimer, Quantitative 0.65 (H) 0.00 - 0.49 MCGFEU/mL   Troponin    Collection  Time: 11/17/22  7:11 AM    Specimen: Blood   Result Value Ref Range    Troponin T <0.010 0.000 - 0.030 ng/mL   CBC Auto Differential    Collection Time: 11/17/22  7:11 AM    Specimen: Blood   Result Value Ref Range    WBC 6.76 3.40 - 10.80 10*3/mm3    RBC 4.22 3.77 - 5.28 10*6/mm3    Hemoglobin 13.0 12.0 - 15.9 g/dL    Hematocrit 39.7 34.0 - 46.6 %    MCV 94.1 79.0 - 97.0 fL    MCH 30.8 26.6 - 33.0 pg    MCHC 32.7 31.5 - 35.7 g/dL    RDW 12.4 12.3 - 15.4 %    RDW-SD 43.7 37.0 - 54.0 fl    MPV 10.5 6.0 - 12.0 fL    Platelets 238 140 - 450 10*3/mm3    Neutrophil % 68.7 42.7 - 76.0 %    Lymphocyte % 18.8 (L) 19.6 - 45.3 %    Monocyte % 9.6 5.0 - 12.0 %    Eosinophil % 1.9 0.3 - 6.2 %    Basophil % 0.9 0.0 - 1.5 %    Immature Grans % 0.1 0.0 - 0.5 %    Neutrophils, Absolute 4.64 1.70 - 7.00 10*3/mm3    Lymphocytes, Absolute 1.27 0.70 - 3.10 10*3/mm3    Monocytes, Absolute 0.65 0.10 - 0.90 10*3/mm3    Eosinophils, Absolute 0.13 0.00 - 0.40 10*3/mm3    Basophils, Absolute 0.06 0.00 - 0.20 10*3/mm3    Immature Grans, Absolute 0.01 0.00 - 0.05 10*3/mm3    nRBC 0.0 0.0 - 0.2 /100 WBC       Ordered the above labs and independently reviewed the results.      RADIOLOGY  XR Chest 1 View    Result Date: 11/17/2022  CHEST SINGLE VIEW  HISTORY: Chest pain with shortness of air.  COMPARISON: None  FINDINGS: Heart size appears within normal limits and mediastinal structures appear normal. There is increased density left lung base partially silhouetting the left hemidiaphragm and this is suspicious for infiltrate in the proper clinical setting, though could represent atelectasis or other process, and recommend follow-up PA and lateral chest. The right lung appears clear. There is no perihilar edema or pneumothorax. Cardiac monitoring leads are present.      Abnormal increased opacity left lung base partially silhouettes the left knee hemidiaphragm. This could represent infiltrate in the proper clinical setting. Atelectasis or other  process are possible and recommend follow-up with PA and lateral chest. CT angiogram chest could be performed if there is suspicion for pulmonary embolus.  This report was finalized on 11/17/2022 7:42 AM by Dr. Dylan Avalos M.D.      CT Angiogram Chest    Result Date: 11/17/2022  CT ANGIOGRAM OF THE CHEST. MULTIPLE CORONAL, SAGITTAL, AND 3-D RECONSTRUCTIONS.  HISTORY: Left-sided chest pain, elevated d-dimer  TECHNIQUE: Radiation dose reduction techniques were utilized, including automated exposure control and exposure modulation based on body size. CT angiogram of the chest was performed. Multiple coronal, sagittal, and 3-D reconstruction images were obtained.  COMPARISON:Chest x-ray same day  FINDINGS: Artifact from motion and overlying leads/monitors.  Thoracic inlet: Within normal limits.  Heart and great vessels: The heart size is within normal limits. No significant pericardial effusion. The aorta is normal in course and caliber.  The main pulmonary artery measures within normal limits. The RV to LV ratio approximates 0.81.  No central filling defects to suggest pulmonary embolus. Evaluation of peripheral branches is somewhat limited by decreased opacification/artifact.  Lymphatics: Within normal limits.  Lung parenchyma and pleural space: Small left pleural effusion. Mild prominence of interstitial markings. Bibasilar airspace disease left greater than right. No pneumothorax. Patchy opacity in the lingula.  Upper abdomen: Small hiatal hernia with thickening of the distal esophagus. Cholecystectomy.   Bone windows: Multilevel degenerative changes.       1.  No central pulmonary embolus. 2.  Small left pleural effusion with bibasilar airspace disease (left greater than right) favoring atelectasis superimposed infection and/or edema could have a similar appearance. Recommend follow-up to resolution after treatment. 3.  Please see above for additional findings/recommendations.  This report was finalized on  11/17/2022 9:31 AM by Dr. Lorri Conklin M.D.        I ordered the above noted radiological studies. Reviewed by me and discussed with radiologist.  See dictation for official radiology interpretation.      PROCEDURES  Procedures      MEDICATIONS GIVEN IN ER    Medications   iopamidol (ISOVUE-370) 76 % injection 100 mL (100 mL Intravenous Given 11/17/22 0852)         PROGRESS, DATA ANALYSIS, CONSULTS, AND MEDICAL DECISION MAKING    All labs have been independently reviewed by me.  All radiology studies have been reviewed by me and discussed with radiologist dictating the report.   EKG's independently viewed and interpreted by me.  Discussion below represents my analysis of pertinent findings related to patient's condition, differential diagnosis, treatment plan and final disposition.      ED Course as of 11/17/22 0953   Thu Nov 17, 2022   0701 EKG          EKG time: 0659  Rhythm/Rate: Sinus 79  P waves and PA: Biphasic P waves suggesting atrial enlargement, normal PA interval  QRS, axis: Unremarkable QRS, unremarkable axis  ST and T waves: Unremarkable ST and T wave    Interpreted Contemporaneously by me, independently viewed  No prior to compare   [DB]   0725 ED HEART SCORE is 2 pending negative troponin. [DB]   0726 ANR-02-runm-old female who has had chest pain ongoing for about 2 days.  Pain is in the left lateral chest and is worsened with movement or deep breath.    Physical exam is relatively unremarkable with benign vitals.    MDM-this pain is somewhat atypical for acute coronary syndrome but still would consider.  Sounds more typical for musculoskeletal pain although there is no reported injury.  Would also consider pulmonary embolism although there is not significant risk factors. [DB]   0754 Chest x-ray discussed with radiologist shows some abnormal opacity in the left base of unclear etiology. [DB]   0754 Labs are reviewed and show benign CBC and chemistries.  Troponin is normal which would go against acute  coronary syndrome particular in a patient has been having pain for greater than 24 hours.  D-dimer is mildly elevated at 0.65 which would increase my concern for pulmonary embolism.  Given elevated D-dimer and abnormal chest x-ray we will get CT angiogram of the chest for further evaluation of left base opacity and elevated D-dimer.  Need to rule out pulmonary embolism. [DB]   0947 CTA of the chest shows no pulmonary embolism.  There is some bibasilar airspace disease left greater than right.    At this point would go ahead and treat for community-acquired pneumonia with p.o. Zithromax.  Would also use anti-inflammatories for pain.  At this point I do not feel we are dealing with acute coronary syndrome or pulmonary embolism.  I suspect left-sided chest pain is related to upper respiratory infection.  Patient advised to follow-up with primary care provider symptoms are not improved in 10 to 14 days. [DB]   5001 I discussed results of testing with patient at bedside.  We will go ahead and treat with course of oral Zithromax.  We will treat pain with combination of anti-inflammatories or Tylenol.  Patient advised to follow-up with primary care provider symptoms are improved in 10 to 14 days.  Return to the ED for increased pain, shortness of breath or as needed. [DB]      ED Course User Index  [DB] Tuan Langley MD       AS OF 09:53 EST VITALS:    BP - 124/81  HR - 58  TEMP - 96.8 °F (36 °C) (Oral)  O2 SATS - 97%      DIAGNOSIS  Final diagnoses:   Community acquired pneumonia of left lower lobe of lung   Chest pain, unspecified type         DISPOSITION  DISCHARGE    Patient discharged in stable condition.    Reviewed implications of results, diagnosis, meds, responsibility to follow up, warning signs and symptoms of possible worsening, potential complications and reasons to return to ER, including increased chest pain, shortness of breath or as needed.    Patient/Family voiced understanding of above  instructions.    Discussed plan for discharge, as there is no emergent indication for admission. Patient referred to primary care provider for BP management due to today's BP. Pt/family is agreeable and understands need for follow up and repeat testing.  Pt is aware that discharge does not mean that nothing is wrong but it indicates no emergency is present that requires admission and they must continue care with follow-up as given below or physician of their choice.     FOLLOW-UP  Deidre Isaac, APRN  [address]  [facility] [address] [phone]    In 10 days  If Not Better         Medication List      New Prescriptions    azithromycin 250 MG tablet  Commonly known as: ZITHROMAX  Take 2 tablets by mouth Daily. Take 2 tablets the first day, then 1 tablet daily for 4 days.           Where to Get Your Medications      These medications were sent to [facility]/pharmacy #[id number] - [place], [place] - [address] - [phone]  - [phone]  [address], [facility] [place] [facility] [address]    Phone: [phone]   · azithromycin 250 MG tablet                Tuan Langley MD  11/17/22 5899

## 2022-11-17 NOTE — ED TRIAGE NOTES
Patient to ED per PV from home, ambulatory to triage w/ reports of chest pain, SOA. Chest pain is in L chest w/ radiation to back; pain worse w/ movement.

## 2024-02-09 ENCOUNTER — PATIENT ROUNDING (BHMG ONLY) (OUTPATIENT)
Dept: URGENT CARE | Facility: CLINIC | Age: 59
End: 2024-02-09
Payer: COMMERCIAL

## 2024-02-09 NOTE — ED NOTES
Thank you for letting us care for you in your recent visit to our urgent care center. We would love to hear about your experience with us. Was this the first time you have visited our location?    We’re always looking for ways to make our patients’ experiences even better. Do you have any recommendations on ways we may improve?     I appreciate you taking the time to respond. Please be on the lookout for a survey about your recent visit from [facility] via text or email. We would greatly appreciate if you could fill that out and turn it back in. We want your voice to be heard and we value your feedback.   Thank you for choosing [facility] for your healthcare needs.     [facility] Practice Manager

## 2024-03-04 ENCOUNTER — HOSPITAL ENCOUNTER (EMERGENCY)
Facility: HOSPITAL | Age: 59
Discharge: HOME | End: 2024-03-04
Attending: STUDENT IN AN ORGANIZED HEALTH CARE EDUCATION/TRAINING PROGRAM | Admitting: STUDENT IN AN ORGANIZED HEALTH CARE EDUCATION/TRAINING PROGRAM
Payer: COMMERCIAL

## 2024-03-04 ENCOUNTER — APPOINTMENT (OUTPATIENT)
Dept: CT IMAGING | Facility: HOSPITAL | Age: 59
End: 2024-03-04
Payer: COMMERCIAL

## 2024-03-04 ENCOUNTER — APPOINTMENT (OUTPATIENT)
Dept: GENERAL RADIOLOGY | Facility: HOSPITAL | Age: 59
End: 2024-03-04
Payer: COMMERCIAL

## 2024-03-04 VITALS
OXYGEN SATURATION: 99 % | RESPIRATION RATE: 18 BRPM | DIASTOLIC BLOOD PRESSURE: 75 MMHG | TEMPERATURE: 99.5 F | WEIGHT: 125 LBS | SYSTOLIC BLOOD PRESSURE: 129 MMHG | BODY MASS INDEX: 23 KG/M2 | HEART RATE: 75 BPM | HEIGHT: 62 IN

## 2024-03-04 DIAGNOSIS — J18.9 PNEUMONIA OF RIGHT LOWER LOBE DUE TO INFECTIOUS ORGANISM: Primary | ICD-10-CM

## 2024-03-04 DIAGNOSIS — R91.8 MULTIPLE SUBSOLID LUNG NODULES GREATER THAN 6 MM IN DIAMETER: ICD-10-CM

## 2024-03-04 LAB
ALBUMIN SERPL-MCNC: 4.2 G/DL (ref 3.5–5.2)
ALBUMIN/GLOB SERPL: 1.4 G/DL
ALP SERPL-CCNC: 158 U/L (ref 39–117)
ALT SERPL W P-5'-P-CCNC: 40 U/L (ref 1–33)
ANION GAP SERPL CALCULATED.3IONS-SCNC: 9 MMOL/L (ref 5–15)
AST SERPL-CCNC: 25 U/L (ref 1–32)
BASOPHILS # BLD AUTO: 0.03 10*3/MM3 (ref 0–0.2)
BASOPHILS NFR BLD AUTO: 0.4 % (ref 0–1.5)
BILIRUB SERPL-MCNC: 0.7 MG/DL (ref 0–1.2)
BUN SERPL-MCNC: 10 MG/DL (ref 6–20)
BUN/CREAT SERPL: 14.5 (ref 7–25)
CALCIUM SPEC-SCNC: 9.3 MG/DL (ref 8.6–10.5)
CHLORIDE SERPL-SCNC: 104 MMOL/L (ref 98–107)
CO2 SERPL-SCNC: 27 MMOL/L (ref 22–29)
CREAT SERPL-MCNC: 0.69 MG/DL (ref 0.57–1)
DEPRECATED RDW RBC AUTO: 39 FL (ref 37–54)
EGFRCR SERPLBLD CKD-EPI 2021: 100.7 ML/MIN/1.73
EOSINOPHIL # BLD AUTO: 0.08 10*3/MM3 (ref 0–0.4)
EOSINOPHIL NFR BLD AUTO: 0.9 % (ref 0.3–6.2)
ERYTHROCYTE [DISTWIDTH] IN BLOOD BY AUTOMATED COUNT: 12.3 % (ref 12.3–15.4)
FLUAV RNA RESP QL NAA+PROBE: NOT DETECTED
FLUBV RNA RESP QL NAA+PROBE: NOT DETECTED
GLOBULIN UR ELPH-MCNC: 3 GM/DL
GLUCOSE SERPL-MCNC: 93 MG/DL (ref 65–99)
HCT VFR BLD AUTO: 35.9 % (ref 34–46.6)
HGB BLD-MCNC: 11.7 G/DL (ref 12–15.9)
IMM GRANULOCYTES # BLD AUTO: 0.04 10*3/MM3 (ref 0–0.05)
IMM GRANULOCYTES NFR BLD AUTO: 0.5 % (ref 0–0.5)
LYMPHOCYTES # BLD AUTO: 1.64 10*3/MM3 (ref 0.7–3.1)
LYMPHOCYTES NFR BLD AUTO: 19.3 % (ref 19.6–45.3)
MCH RBC QN AUTO: 28.7 PG (ref 26.6–33)
MCHC RBC AUTO-ENTMCNC: 32.6 G/DL (ref 31.5–35.7)
MCV RBC AUTO: 88.2 FL (ref 79–97)
MONOCYTES # BLD AUTO: 1 10*3/MM3 (ref 0.1–0.9)
MONOCYTES NFR BLD AUTO: 11.8 % (ref 5–12)
NEUTROPHILS NFR BLD AUTO: 5.72 10*3/MM3 (ref 1.7–7)
NEUTROPHILS NFR BLD AUTO: 67.1 % (ref 42.7–76)
NRBC BLD AUTO-RTO: 0 /100 WBC (ref 0–0.2)
PLATELET # BLD AUTO: 364 10*3/MM3 (ref 140–450)
PMV BLD AUTO: 9.8 FL (ref 6–12)
POTASSIUM SERPL-SCNC: 4.5 MMOL/L (ref 3.5–5.2)
PROT SERPL-MCNC: 7.2 G/DL (ref 6–8.5)
RBC # BLD AUTO: 4.07 10*6/MM3 (ref 3.77–5.28)
RSV RNA RESP QL NAA+PROBE: NOT DETECTED
SARS-COV-2 RNA RESP QL NAA+PROBE: NOT DETECTED
SODIUM SERPL-SCNC: 140 MMOL/L (ref 136–145)
WBC NRBC COR # BLD AUTO: 8.51 10*3/MM3 (ref 3.4–10.8)

## 2024-03-04 PROCEDURE — 85025 COMPLETE CBC W/AUTO DIFF WBC: CPT | Performed by: STUDENT IN AN ORGANIZED HEALTH CARE EDUCATION/TRAINING PROGRAM

## 2024-03-04 PROCEDURE — 25510000001 IOPAMIDOL 61 % SOLUTION: Performed by: STUDENT IN AN ORGANIZED HEALTH CARE EDUCATION/TRAINING PROGRAM

## 2024-03-04 PROCEDURE — 99285 EMERGENCY DEPT VISIT HI MDM: CPT

## 2024-03-04 PROCEDURE — 71045 X-RAY EXAM CHEST 1 VIEW: CPT

## 2024-03-04 PROCEDURE — 71260 CT THORAX DX C+: CPT

## 2024-03-04 PROCEDURE — 80053 COMPREHEN METABOLIC PANEL: CPT | Performed by: STUDENT IN AN ORGANIZED HEALTH CARE EDUCATION/TRAINING PROGRAM

## 2024-03-04 PROCEDURE — 87637 SARSCOV2&INF A&B&RSV AMP PRB: CPT

## 2024-03-04 RX ORDER — AMOXICILLIN AND CLAVULANATE POTASSIUM 875; 125 MG/1; MG/1
1 TABLET, FILM COATED ORAL ONCE
Status: COMPLETED | OUTPATIENT
Start: 2024-03-04 | End: 2024-03-04

## 2024-03-04 RX ORDER — AMOXICILLIN AND CLAVULANATE POTASSIUM 875; 125 MG/1; MG/1
1 TABLET, FILM COATED ORAL 2 TIMES DAILY
Qty: 14 TABLET | Refills: 0 | Status: SHIPPED | OUTPATIENT
Start: 2024-03-04 | End: 2024-03-11

## 2024-03-04 RX ADMIN — IOPAMIDOL 85 ML: 612 INJECTION, SOLUTION INTRAVENOUS at 19:10

## 2024-03-04 RX ADMIN — AMOXICILLIN AND CLAVULANATE POTASSIUM 1 TABLET: 875; 125 TABLET, FILM COATED ORAL at 20:29

## 2024-03-05 NOTE — ED PROVIDER NOTES
EMERGENCY DEPARTMENT ENCOUNTER  Room Number:  11/11  PCP: Deidre Isaac APRN  Independent Historians: Patient      HPI:  Chief Complaint: had concerns including Cough.         Context: Twila Allison is a 59 y.o. female with a medical history of anxiety who presents to the ED c/o acute cough.  Patient states she has had a chronic cough for approximately 4 weeks.  Patient underwent course of antibiotics as prescribed by her primary care and has not had any improvement in symptoms.  Patient denies fever, chills.  Patient states there is no discolored sputum or blood when she coughs.  Patient denies fever or chills.  Patient states she may have had viral symptoms when she first had the cough but she could not really remember.      Review of prior external notes (non-ED) -and- Review of prior external test results outside of this encounter: Office care visit with urgent care from 2/6/2024 reviewed and notable for presentation secondary to acute sinusitis.  Patient was started on Augmentin and provided cough syrup.  Patient additionally started on ciprofloxacin drops for conjunctivitis of both eyes.    PAST MEDICAL HISTORY  Active Ambulatory Problems     Diagnosis Date Noted    Encounter for screening for malignant neoplasm of colon 04/02/2018    Anxiety     Hemorrhoids 03/02/2022    Constipation 03/02/2022    Blood in stool 03/02/2022     Resolved Ambulatory Problems     Diagnosis Date Noted    Acute appendicitis with localized peritonitis 12/24/2016     No Additional Past Medical History         PAST SURGICAL HISTORY  Past Surgical History:   Procedure Laterality Date    APPENDECTOMY N/A 12/24/2016    Procedure: APPENDECTOMY LAPAROSCOPIC;  Surgeon: Sebastian Hernandez Jr., MD;  Location: [facility] MAIN OR;  Service:     CHOLECYSTECTOMY N/A 1999    COLONOSCOPY N/A 04/13/2018    Procedure: COLONOSCOPY into cecum and TI with cold biopsy polypectomies;  Surgeon: Roselyn Leung MD;  Location: [facility] ENDOSCOPY;  Service:  Gastroenterology    LASIK Bilateral 02/25/2022    WRIST FUSION Left 2021         FAMILY HISTORY  Family History   Problem Relation Age of Onset    No Known Problems Mother     Hypertension Father     Heart disease Father     Heart attack Father 65        father passed in 2021 at age 81 years    Cancer Brother     Pancreatic cancer Brother     Cancer Brother     Lung cancer Brother     No Known Problems Daughter     No Known Problems Daughter     Malig Hyperthermia Neg Hx          SOCIAL HISTORY  Social History     Socioeconomic History    Marital status: Single   Tobacco Use    Smoking status: Never    Smokeless tobacco: Never   Vaping Use    Vaping status: Never Used   Substance and Sexual Activity    Alcohol use: Yes     Alcohol/week: 3.0 standard drinks of alcohol     Types: 3 Cans of beer per week     Comment: a few times a year    Drug use: Defer    Sexual activity: Defer     Birth control/protection: Post-menopausal         ALLERGIES  Demerol [meperidine]      REVIEW OF SYSTEMS  Review of Systems  Included in HPI  All systems reviewed and negative except for those discussed in HPI.      PHYSICAL EXAM    I have reviewed the triage vital signs and nursing notes.    ED Triage Vitals   Temp Heart Rate Resp BP SpO2   03/04/24 1544 03/04/24 1544 03/04/24 1544 03/04/24 1546 03/04/24 1544   100 °F (37.8 °C) 91 18 143/87 94 %      Temp src Heart Rate Source Patient Position BP Location FiO2 (%)   03/04/24 1544 03/04/24 1753 03/04/24 1546 03/04/24 1753 --   Tympanic Monitor Sitting Right arm        Physical Exam  GENERAL: alert, no acute distress  SKIN: Warm, dry  HENT: Normocephalic, atraumatic  EYES: no scleral icterus  CV: regular rhythm, regular rate  RESPIRATORY: normal effort, lungs clear, intermittent cough  ABDOMEN: soft, nontender, nondistended  MUSCULOSKELETAL: no deformity  NEURO: alert, moves all extremities, follows commands      LAB RESULTS  Recent Results (from the past 24 hour(s))   COVID-19, FLU A/B,  RSV PCR 1 HR TAT - Swab, Nasopharynx    Collection Time: 03/04/24  3:48 PM    Specimen: Nasopharynx; Swab   Result Value Ref Range    COVID19 Not Detected Not Detected - Ref. Range    Influenza A PCR Not Detected Not Detected    Influenza B PCR Not Detected Not Detected    RSV, PCR Not Detected Not Detected   Comprehensive Metabolic Panel    Collection Time: 03/04/24  6:06 PM    Specimen: Blood   Result Value Ref Range    Glucose 93 65 - 99 mg/dL    BUN 10 6 - 20 mg/dL    Creatinine 0.69 0.57 - 1.00 mg/dL    Sodium 140 136 - 145 mmol/L    Potassium 4.5 3.5 - 5.2 mmol/L    Chloride 104 98 - 107 mmol/L    CO2 27.0 22.0 - 29.0 mmol/L    Calcium 9.3 8.6 - 10.5 mg/dL    Total Protein 7.2 6.0 - 8.5 g/dL    Albumin 4.2 3.5 - 5.2 g/dL    ALT (SGPT) 40 (H) 1 - 33 U/L    AST (SGOT) 25 1 - 32 U/L    Alkaline Phosphatase 158 (H) 39 - 117 U/L    Total Bilirubin 0.7 0.0 - 1.2 mg/dL    Globulin 3.0 gm/dL    A/G Ratio 1.4 g/dL    BUN/Creatinine Ratio 14.5 7.0 - 25.0    Anion Gap 9.0 5.0 - 15.0 mmol/L    eGFR 100.7 >60.0 mL/min/1.73   CBC Auto Differential    Collection Time: 03/04/24  6:06 PM    Specimen: Blood   Result Value Ref Range    WBC 8.51 3.40 - 10.80 10*3/mm3    RBC 4.07 3.77 - 5.28 10*6/mm3    Hemoglobin 11.7 (L) 12.0 - 15.9 g/dL    Hematocrit 35.9 34.0 - 46.6 %    MCV 88.2 79.0 - 97.0 fL    MCH 28.7 26.6 - 33.0 pg    MCHC 32.6 31.5 - 35.7 g/dL    RDW 12.3 12.3 - 15.4 %    RDW-SD 39.0 37.0 - 54.0 fl    MPV 9.8 6.0 - 12.0 fL    Platelets 364 140 - 450 10*3/mm3    Neutrophil % 67.1 42.7 - 76.0 %    Lymphocyte % 19.3 (L) 19.6 - 45.3 %    Monocyte % 11.8 5.0 - 12.0 %    Eosinophil % 0.9 0.3 - 6.2 %    Basophil % 0.4 0.0 - 1.5 %    Immature Grans % 0.5 0.0 - 0.5 %    Neutrophils, Absolute 5.72 1.70 - 7.00 10*3/mm3    Lymphocytes, Absolute 1.64 0.70 - 3.10 10*3/mm3    Monocytes, Absolute 1.00 (H) 0.10 - 0.90 10*3/mm3    Eosinophils, Absolute 0.08 0.00 - 0.40 10*3/mm3    Basophils, Absolute 0.03 0.00 - 0.20 10*3/mm3    Immature  Grans, Absolute 0.04 0.00 - 0.05 10*3/mm3    nRBC 0.0 0.0 - 0.2 /100 WBC         RADIOLOGY  CT Chest With Contrast Diagnostic    Result Date: 3/4/2024  CT CHEST WITH IV CONTRAST  HISTORY: Abnormal chest x-ray, lung nodule  TECHNIQUE: Radiation dose reduction techniques were utilized, including automated exposure control and exposure modulation based on body size. Axial images were obtained through the chest after the administration of IV contrast. Coronal and sagittal reformatted images obtained.  COMPARISON: Chest x-ray from today, chest CT from 11/17/2022  FINDINGS: There is a cluster of nodules in the posterior right upper lobe accounting for the finding on the chest x-ray with the largest nodule measuring about 1.3 cm. There is a consolidation in the base of the right lower lobe. There is also some bronchial wall thickening and mucous plugging within multiple right lower lobe bronchi. The left lung is clear. There is no appreciable lymphadenopathy. There is no pleural effusion. Limited imaging of the upper abdomen shows a prior cholecystectomy. No acute bony abnormality      1. There is a dense consolidation in the base of the right lower lobe with associated bronchial wall thickening and mucous plugging. Findings are most consistent with a right lower lobe pneumonia 2. Cluster of nodules in the posterior right upper lobe with largest nodule measuring about 1.3 cm. These may also be infectious/inflammatory although could potentially reflect neoplasm. 3. Short interval follow-up chest CT in about 1 month is recommended to ensure clearing of the pneumonia and evaluate for clearing of the right upper lobe nodules. Alternatively, a PET/CT could be performed to evaluate the right upper lobe nodules.    Radiation dose reduction techniques were utilized, including automated exposure control and exposure modulation based on body size.   This report was finalized on 3/4/2024 7:51 PM by Dr. Xander Aguirre M.D on  Workstation: [id number]      XR Chest AP    Result Date: 3/4/2024  AP CHEST  HISTORY: Cough and fever  COMPARISON: 11/17/2022  FINDINGS: There is a new 1.5 cm nodular density in the right upper lung zone. There is some mild atelectasis in the right lung base. Heart size normal. Right upper quadrant surgical clips. Visualized osseous structures appear unremarkable      New 1.5 cm nodular density in the right upper lung zone suspicious for lung nodule. Further evaluation is recommended with chest CT    This report was finalized on 3/4/2024 5:02 PM by Dr. Xander Aguirre M.D on Workstation: [id number]         MEDICATIONS GIVEN IN ER  Medications   amoxicillin-clavulanate (AUGMENTIN) 875-125 MG per tablet 1 tablet (has no administration in time range)   iopamidol (ISOVUE-300) 61 % injection 100 mL (85 mL Intravenous Given 3/4/24 1910)         ORDERS PLACED DURING THIS VISIT:  Orders Placed This Encounter   Procedures    COVID-19, FLU A/B, RSV PCR 1 HR TAT - Swab, Nasopharynx    XR Chest AP    CT Chest With Contrast Diagnostic    Comprehensive Metabolic Panel    CBC Auto Differential    Ambulatory Referral to Pulmonology    CBC & Differential         OUTPATIENT MEDICATION MANAGEMENT:  Current Facility-Administered Medications Ordered in Epic   Medication Dose Route Frequency Provider Last Rate Last Admin    amoxicillin-clavulanate (AUGMENTIN) 875-125 MG per tablet 1 tablet  1 tablet Oral Once Roc Luong MD         Current Outpatient Medications Ordered in Epic   Medication Sig Dispense Refill    amoxicillin-clavulanate (AUGMENTIN) 875-125 MG per tablet Take 1 tablet by mouth 2 (Two) Times a Day for 7 days. 14 tablet 0    brompheniramine-pseudoephedrine-DM 30-2-10 MG/5ML syrup Take 10 mL by mouth 4 (Four) Times a Day As Needed for Congestion, Cough or Allergies. 200 mL 0    ciprofloxacin (CILOXAN) 0.3 % ophthalmic solution Administer 1 drop to both eyes 4 (Four) Times a Day. 5 mL 0    colestipol (COLESTID) 5 g  granules Take 5 g by mouth 2 (Two) Times a Day for 30 days. 300 g 0    escitalopram (LEXAPRO) 10 MG tablet Take 20 mg by mouth Daily.      escitalopram (LEXAPRO) 10 MG tablet Take 1 tablet by mouth Daily.      Hydrocortisone, Perianal, (ANUSOL-HC) 2.5 % rectal cream Apply externally to hemorrhoids twice daily . 28 g 0    ibandronate (BONIVA) 150 MG tablet TAKE 1 TABLET BY MOUTH ONCE MONTHLY      Omega-3 Fatty Acids (fish oil) 1000 MG capsule capsule Take 1,000 mg by mouth Daily With Breakfast.           PROGRESS, DATA ANALYSIS, CONSULTS, AND MEDICAL DECISION MAKING  All labs have been independently interpreted by me.  All radiology studies have been reviewed by me. All EKG's have been independently viewed and interpreted by me.  Discussion below represents my analysis of pertinent findings related to patient's condition, differential diagnosis, treatment plan and final disposition.    Differential diagnosis includes but is not limited to pneumonia, postviral syndrome, chronic cough.    Clinical Scores:                   ED Course as of 03/04/24 2020   Mon Mar 04, 2024   1917 Chest x-ray interpreted by me and demonstrates no consolidation, right upper lobe nodule noted [MW]   2016 CT chest ordered due to chest x-ray findings.  CT demonstrates right lower lobe pneumonia and collection of multiple pulmonary nodules concerning for infectious/inflammatory/neoplasm.  I informed patient of these results and the need to follow closely with primary care.  I will also place referral to pulmonology.  Patient given first dose of antibiotic in the emergency department and started on course of Augmentin. [MW]      ED Course User Index  [MW] Roc Luong MD             AS OF 20:20 EST VITALS:    BP - 129/75  HR - 75  TEMP - 99.5 °F (37.5 °C) (Oral)  O2 SATS - 99%    COMPLEXITY OF CARE  Admission was considered but after careful review of the patient's presentation, physical examination, diagnostic results, and response to  treatment the patient may be safely discharged with outpatient follow-up.      DIAGNOSIS  Final diagnoses:   Pneumonia of right lower lobe due to infectious organism   Multiple subsolid lung nodules greater than 6 mm in diameter         DISPOSITION  ED Disposition       ED Disposition   Discharge    Condition   Stable    Comment   --                Please note that portions of this document were completed with a voice recognition program.    Note Disclaimer: At [facility], we believe that sharing information builds trust and better relationships. You are receiving this note because you recently visited [facility]. It is possible you will see health information before a provider has talked with you about it. This kind of information can be easy to misunderstand. To help you fully understand what it means for your health, we urge you to discuss this note with your provider.         Roc Luong MD  03/04/24 2020

## 2024-03-05 NOTE — DISCHARGE INSTRUCTIONS
Please follow-up with your primary care provider within 1 to 2 weeks for repeat evaluation    I have placed a referral order to our pulmonology team.  They will contact you to schedule an appointment.  If you do not hear from them please request your primary care doctor to place a referral.    Please return to the emergency department with any new or worsening symptoms including but not limited to chest pain, shortness of breath, fevers, chills, uncontrolled nausea or vomiting    Please take all medications as prescribed

## 2024-03-13 ENCOUNTER — OFFICE VISIT (OUTPATIENT)
Dept: FAMILY MEDICINE CLINIC | Facility: CLINIC | Age: 59
End: 2024-03-13
Payer: COMMERCIAL

## 2024-03-13 VITALS
BODY MASS INDEX: 23.41 KG/M2 | WEIGHT: 127.2 LBS | SYSTOLIC BLOOD PRESSURE: 114 MMHG | HEIGHT: 62 IN | DIASTOLIC BLOOD PRESSURE: 72 MMHG | HEART RATE: 75 BPM | TEMPERATURE: 97.3 F | OXYGEN SATURATION: 98 %

## 2024-03-13 DIAGNOSIS — R91.1 LUNG NODULE SEEN ON IMAGING STUDY: ICD-10-CM

## 2024-03-13 DIAGNOSIS — J18.9 PNEUMONIA OF RIGHT LOWER LOBE DUE TO INFECTIOUS ORGANISM: Primary | ICD-10-CM

## 2024-03-13 NOTE — PATIENT INSTRUCTIONS
Follow up pending CT scan results.  Follow up in 2 months for physical with fasting lab, or sooner if problems or concerns.

## 2024-03-13 NOTE — PROGRESS NOTES
Subjective   Twila Allison is a 59 y.o. female.     Chief Complaint   Patient presents with    er follow up        History of Present Illness   Patient presents for ER follow up; patient went to [facility] ER on 3/4/24 with c/o cough and fever; [place] [facility] [place] and been on antibiotic before went to ER, but symptoms worse; had some chest discomfort with breathing, but no SOA; had chest x-ray and CT scan--diagnosed with pneumonia; also found mass on lung; never smoker; has strong family history of cancer; referred to pulmo, but pt has not heard abour referral; discharged home with Rx for Augmentin; finished Augmentin yesterday; cough has mostly resolved at this point; no recent fever; no SOA.      The following portions of the patient's history were reviewed and updated as appropriate: allergies, current medications, past family history, past medical history, past social history, past surgical history and problem list.    Current Outpatient Medications on File Prior to Visit   Medication Sig    ibandronate (BONIVA) 150 MG tablet TAKE 1 TABLET BY MOUTH ONCE MONTHLY    Omega-3 Fatty Acids (fish oil) 1000 MG capsule capsule Take 1 capsule by mouth Daily With Breakfast.    escitalopram (LEXAPRO) 10 MG tablet Take 2 tablets by mouth Daily. (Patient not taking: Reported on 3/13/2024)     No current facility-administered medications on file prior to visit.        Past Medical History:   Diagnosis Date    Acute appendicitis with localized peritonitis 12/24/2016    Anxiety        Past Surgical History:   Procedure Laterality Date    APPENDECTOMY N/A 12/24/2016    Procedure: APPENDECTOMY LAPAROSCOPIC;  Surgeon: Sebastian Hernandez Jr., MD;  Location: [facility] MAIN OR;  Service:     CHOLECYSTECTOMY N/A 1999    COLONOSCOPY N/A 04/13/2018    Procedure: COLONOSCOPY into cecum and TI with cold biopsy polypectomies;  Surgeon: Roselyn Leung MD;  Location: [facility] ENDOSCOPY;  Service: Gastroenterology    LASIK Bilateral  02/25/2022    WRIST FUSION Left 2021       Family History   Problem Relation Age of Onset    Colon cancer Mother 83        colon and rectal cancer    Hypertension Father     Heart disease Father     Heart attack Father 65        father passed in 2021 at age 81 years    Cancer Brother     Pancreatic cancer Brother     Cancer Brother     Lung cancer Brother     No Known Problems Daughter     No Known Problems Daughter     Malig Hyperthermia Neg Hx        Social History     Socioeconomic History    Marital status: Single   Tobacco Use    Smoking status: Never    Smokeless tobacco: Never   Vaping Use    Vaping status: Never Used   Substance and Sexual Activity    Alcohol use: Yes     Alcohol/week: 3.0 standard drinks of alcohol     Types: 3 Cans of beer per week     Comment: a few times a year    Drug use: Defer    Sexual activity: Defer     Birth control/protection: Post-menopausal       Review of Systems   Constitutional:  Negative for appetite change, chills (none in past week), fatigue, fever, unexpected weight gain and unexpected weight loss.   HENT:  Negative for ear discharge, sore throat and trouble swallowing. Sinus pressure: some this time of year; mild.   Eyes:  Negative for blurred vision and discharge.   Respiratory:  Negative for cough (see HPI), chest tightness and shortness of breath.    Cardiovascular:  Negative for chest pain, palpitations and leg swelling.   Gastrointestinal:  Negative for abdominal pain, blood in stool, constipation, diarrhea, GERD and indigestion.   Endocrine: Negative for polydipsia.   Genitourinary:  Negative for dysuria and frequency.   Musculoskeletal:  Negative for back pain (no unexplained back pain).   Skin:  Negative for rash.   Neurological:  Negative for dizziness, syncope, light-headedness and headache.   Hematological:  Does not bruise/bleed easily.       Objective   Vitals:    03/13/24 1113   BP: 114/72   BP Location: Left arm   Patient Position: Sitting   Cuff Size:  "Adult   Pulse: 75   Temp: 97.3 °F (36.3 °C)   SpO2: 98%   Weight: 57.7 kg (127 lb 3.2 oz)   Height: 157.5 cm (62\")     Body mass index is 23.27 kg/m².    Physical Exam  Vitals and nursing note reviewed.   Constitutional:       General: She is not in acute distress.     Appearance: She is well-developed and well-groomed. She is not diaphoretic.   HENT:      Head: Normocephalic.      Right Ear: External ear normal.      Left Ear: External ear normal.   Eyes:      Conjunctiva/sclera: Conjunctivae normal.   Neck:      Vascular: No carotid bruit.   Cardiovascular:      Rate and Rhythm: Normal rate and regular rhythm.      Pulses: Normal pulses.      Heart sounds: Normal heart sounds. No murmur heard.  Pulmonary:      Effort: Pulmonary effort is normal. No respiratory distress.      Breath sounds: Normal breath sounds. No decreased breath sounds or rales.   Abdominal:      General: Bowel sounds are normal.      Palpations: Abdomen is soft. There is no hepatomegaly or splenomegaly.      Tenderness: There is no abdominal tenderness. There is no guarding.   Musculoskeletal:      Cervical back: Normal range of motion and neck supple.      Right lower leg: No edema.      Left lower leg: No edema.   Skin:     General: Skin is warm and dry.      Findings: No rash.   Neurological:      Mental Status: She is alert and oriented to person, place, and time.      Gait: Gait normal.   Psychiatric:         Mood and Affect: Mood normal.         Behavior: Behavior normal.         Thought Content: Thought content normal.         Cognition and Memory: Cognition normal.         Judgment: Judgment normal.         Lab Results   Component Value Date    WBC 8.51 03/04/2024    RBC 4.07 03/04/2024    HGB 11.7 (L) 03/04/2024    HCT 35.9 03/04/2024    MCV 88.2 03/04/2024    MCH 28.7 03/04/2024    MCHC 32.6 03/04/2024    RDW 12.3 03/04/2024    RDWSD 39.0 03/04/2024    MPV 9.8 03/04/2024     03/04/2024    NEUTRORELPCT 67.1 03/04/2024    " LYMPHORELPCT 19.3 (L) 03/04/2024    MONORELPCT 11.8 03/04/2024    EOSRELPCT 0.9 03/04/2024    BASORELPCT 0.4 03/04/2024    AUTOIGPER 0.5 03/04/2024    NEUTROABS 5.72 03/04/2024    LYMPHSABS 1.64 03/04/2024    MONOSABS 1.00 (H) 03/04/2024    EOSABS 0.08 03/04/2024    BASOSABS 0.03 03/04/2024    AUTOIGNUM 0.04 03/04/2024    NRBC 0.0 03/04/2024     Lab Results   Component Value Date    GLUCOSE 93 03/04/2024    BUN 10 03/04/2024    CREATININE 0.69 03/04/2024    EGFRIFNONA 104 12/25/2016    BCR 14.5 03/04/2024    K 4.5 03/04/2024    CO2 27.0 03/04/2024    CALCIUM 9.3 03/04/2024    PROTENTOTREF 7.1 03/01/2022    ALBUMIN 4.2 03/04/2024    LABIL2 1.8 03/01/2022    AST 25 03/04/2024    ALT 40 (H) 03/04/2024      Lab Results   Component Value Date    CHLPL 214 (H) 03/01/2022    TRIG 45 03/01/2022     03/01/2022    VLDL 8 03/01/2022     (H) 03/01/2022     Lab Results   Component Value Date    TSH 1.970 03/01/2022       Records from [facility] on 3/4/24 reviewed; presents to the ED c/o acute cough.  Patient states she has had a chronic cough for approximately 4 weeks.  Patient underwent course of antibiotics as prescribed by [facility] and has not had any improvement in symptoms.  Patient denies fever, chills.  Patient states there is no discolored sputum or blood when she coughs.  Patient denies fever or chills.  Patient states she may have had viral symptoms when she first had the cough but she could not really remember.  Had chest x-ray and CT chest. CT noted RLL pneumonia and collection of multiple pulmonary nodules concerning for infectious/inflammatory/neoplasm; referred to pulmonary; started on Augmentin; to follow up with PCP.    3/4/24 chest x-ray: There is a new 1.5 cm nodular density in the right upper lung zone. There is some mild atelectasis in the right lung base. Heart size normal. Right upper quadrant surgical clips. Visualized osseous structures appear unremarkable.    3/4/24 CT chest: There is a  dense consolidation in the base of the right lower lobe with associated bronchial wall thickening and mucous plugging. Findings are most consistent with a right lower lobe pneumonia 2. Cluster of nodules in the posterior right upper lobe with largest nodule measuring about 1.3 cm. These may also be infectious/inflammatory although could potentially reflect neoplasm. 3. Short interval follow-up chest CT in about 1 month is recommended to ensure clearing of the pneumonia and evaluate for clearing of the right upper lobe nodules. Alternatively, a PET/CT could be performed to evaluate the right upper lobe nodules     Assessment    Problem List Items Addressed This Visit       Lung nodule seen on imaging study    Overview     3/4/24 CT chest: Cluster of nodules in the posterior right upper lobe with largest nodule measuring about 1.3 cm. These may also be infectious/inflammatory although could potentially reflect neoplasm. Short interval follow-up chest CT in about 1 month is recommended to ensure clearing of the pneumonia and evaluate for clearing of the right upper lobe nodules.          Current Assessment & Plan     Follow up as scheduled with pulmonary.         Relevant Orders    CT Chest Without Contrast    Pneumonia of right lower lobe due to infectious organism - Primary    Current Assessment & Plan     Symptoms resolved with Augmentin.         Relevant Orders    CT Chest Without Contrast        Return in about 2 months (around 5/13/2024) for Annual physical, Recheck.or sooner if problems or concerns.  Will check on referral to pulmonary; will get follow up CT chest as recommended.

## 2024-03-14 PROBLEM — R93.89 ABNORMAL CT OF THE CHEST: Status: ACTIVE | Noted: 2024-03-14

## 2024-03-14 PROBLEM — J18.9 PNEUMONIA OF RIGHT LOWER LOBE DUE TO INFECTIOUS ORGANISM: Status: ACTIVE | Noted: 2024-03-14

## 2024-03-14 PROBLEM — R93.89 ABNORMAL CT OF THE CHEST: Status: RESOLVED | Noted: 2024-03-14 | Resolved: 2024-03-14

## 2024-03-14 PROBLEM — R91.1 LUNG NODULE SEEN ON IMAGING STUDY: Status: ACTIVE | Noted: 2024-03-14

## 2024-04-24 ENCOUNTER — HOSPITAL ENCOUNTER (OUTPATIENT)
Dept: CT IMAGING | Facility: HOSPITAL | Age: 59
Discharge: HOME | End: 2024-04-24
Admitting: NURSE PRACTITIONER
Payer: COMMERCIAL

## 2024-04-24 DIAGNOSIS — J18.9 PNEUMONIA OF RIGHT LOWER LOBE DUE TO INFECTIOUS ORGANISM: ICD-10-CM

## 2024-04-24 DIAGNOSIS — R91.1 LUNG NODULE SEEN ON IMAGING STUDY: ICD-10-CM

## 2024-04-24 PROCEDURE — 71250 CT THORAX DX C-: CPT

## 2024-05-01 DIAGNOSIS — R91.1 LUNG NODULE SEEN ON IMAGING STUDY: Primary | ICD-10-CM

## 2024-09-26 ENCOUNTER — HOSPITAL ENCOUNTER (OUTPATIENT)
Dept: CT IMAGING | Facility: HOSPITAL | Age: 59
Discharge: HOME | End: 2024-09-26
Admitting: NURSE PRACTITIONER
Payer: COMMERCIAL

## 2024-09-26 DIAGNOSIS — R91.1 LUNG NODULE SEEN ON IMAGING STUDY: ICD-10-CM

## 2024-09-26 PROCEDURE — 71250 CT THORAX DX C-: CPT

## 2024-10-01 DIAGNOSIS — R91.1 LUNG NODULE SEEN ON IMAGING STUDY: Primary | ICD-10-CM

## 2024-10-01 PROBLEM — J18.9 PNEUMONIA OF RIGHT LOWER LOBE DUE TO INFECTIOUS ORGANISM: Status: RESOLVED | Noted: 2024-03-14 | Resolved: 2024-10-01

## 2024-10-09 ENCOUNTER — OFFICE VISIT (OUTPATIENT)
Dept: FAMILY MEDICINE CLINIC | Facility: CLINIC | Age: 59
End: 2024-10-09
Payer: COMMERCIAL

## 2024-10-09 VITALS
HEART RATE: 68 BPM | OXYGEN SATURATION: 100 % | BODY MASS INDEX: 24.95 KG/M2 | TEMPERATURE: 98.2 F | WEIGHT: 135.6 LBS | DIASTOLIC BLOOD PRESSURE: 78 MMHG | HEIGHT: 62 IN | SYSTOLIC BLOOD PRESSURE: 114 MMHG

## 2024-10-09 DIAGNOSIS — Z00.00 ENCOUNTER FOR ANNUAL PHYSICAL EXAM: Primary | ICD-10-CM

## 2024-10-09 DIAGNOSIS — Z11.59 NEED FOR HEPATITIS C SCREENING TEST: ICD-10-CM

## 2024-10-09 DIAGNOSIS — H81.11 BENIGN PAROXYSMAL POSITIONAL VERTIGO OF RIGHT EAR: ICD-10-CM

## 2024-10-09 DIAGNOSIS — F41.9 ANXIETY: ICD-10-CM

## 2024-10-09 DIAGNOSIS — Z13.6 ENCOUNTER FOR LIPID SCREENING FOR CARDIOVASCULAR DISEASE: ICD-10-CM

## 2024-10-09 DIAGNOSIS — M81.0 OSTEOPOROSIS, UNSPECIFIED OSTEOPOROSIS TYPE, UNSPECIFIED PATHOLOGICAL FRACTURE PRESENCE: ICD-10-CM

## 2024-10-09 DIAGNOSIS — Z13.220 ENCOUNTER FOR LIPID SCREENING FOR CARDIOVASCULAR DISEASE: ICD-10-CM

## 2024-10-09 DIAGNOSIS — R53.83 FATIGUE, UNSPECIFIED TYPE: ICD-10-CM

## 2024-10-09 DIAGNOSIS — F33.1 MODERATE EPISODE OF RECURRENT MAJOR DEPRESSIVE DISORDER: ICD-10-CM

## 2024-10-09 DIAGNOSIS — D64.9 ANEMIA, UNSPECIFIED TYPE: ICD-10-CM

## 2024-10-09 DIAGNOSIS — J01.10 ACUTE NON-RECURRENT FRONTAL SINUSITIS: ICD-10-CM

## 2024-10-09 DIAGNOSIS — Z23 ENCOUNTER FOR IMMUNIZATION: ICD-10-CM

## 2024-10-09 DIAGNOSIS — R91.1 LUNG NODULE SEEN ON IMAGING STUDY: ICD-10-CM

## 2024-10-09 DIAGNOSIS — Z86.0100 HISTORY OF COLON POLYPS: ICD-10-CM

## 2024-10-09 PROBLEM — K92.1 BLOOD IN STOOL: Status: RESOLVED | Noted: 2022-03-02 | Resolved: 2024-10-09

## 2024-10-09 PROCEDURE — 90715 TDAP VACCINE 7 YRS/> IM: CPT | Performed by: NURSE PRACTITIONER

## 2024-10-09 PROCEDURE — 90471 IMMUNIZATION ADMIN: CPT | Performed by: NURSE PRACTITIONER

## 2024-10-09 PROCEDURE — 99396 PREV VISIT EST AGE 40-64: CPT | Performed by: NURSE PRACTITIONER

## 2024-10-09 RX ORDER — MECLIZINE HYDROCHLORIDE 25 MG/1
25 TABLET ORAL 3 TIMES DAILY PRN
Qty: 10 TABLET | Refills: 0 | Status: SHIPPED | OUTPATIENT
Start: 2024-10-09

## 2024-10-09 RX ORDER — IBANDRONATE SODIUM 150 MG/1
150 TABLET, FILM COATED ORAL
Qty: 3 TABLET | Refills: 3 | Status: SHIPPED | OUTPATIENT
Start: 2024-10-09

## 2024-10-09 RX ORDER — AMOXICILLIN 875 MG
875 TABLET ORAL 2 TIMES DAILY
Qty: 14 TABLET | Refills: 0 | Status: SHIPPED | OUTPATIENT
Start: 2024-10-09 | End: 2024-10-16

## 2024-10-09 NOTE — PATIENT INSTRUCTIONS
Try taking Escitalopram 10 mg 2 tablets daily for total of 20 mg daily.  Call in 2 weeks regarding refill dose.  Continue increased intake of clear liquids and rest.  Do not take Meclizine if need to be alert.  Continue to work on healthy diet and exercise.  Follow up pending lab results.  Follow up in 6 months, or sooner if symptoms persist or worsen.

## 2024-10-09 NOTE — PROGRESS NOTES
Subjective   Twila Allison is a 59 y.o. female.     Chief Complaint   Patient presents with    Annual Exam     The past couple of months she has had some fullness in both ears and she says its caused some dizziness        History of Present Illness   Patient presents for CPE with non-fasting labs; pretty healthy diet; regular exercise, walks daily; drinks a lot of water; regular dental visits; last eye exam about 2 weeks ago, history of Lasik; no problems hearing; immunizations: needs Tdap today, will check with insurance regarding Shingrix; sees [facility] for female care; last PAP 11/2022; last mammo 11/2023; no family history of breast cancer; colonoscopy 4/2018, to repeat in 5 years; mother with family history of colorectal cancer.     F/U lung nodules: had recent repeat CT chest and noted new 6 mm noncalcified nodule; never smoked but was exposed to second hand smoke as child; will repeat CT chest in 6 months as recommended; saw pulmonary 4/2024.    F/U anxiety/depression: takes Escitalopram daily per GYN, but would like PCP to take over Rx; Escitalopram helps but feels like may need higher dose; has trouble falling asleep and staying asleep; see PHQ-9 and RUTH-7; no SI/HI.    F/U osteoporosis: takes Ibandronate monthly; no problems with medication; last bone density about 1 year ago.    Also, c/o ears feeling full; has had dizziness due to symptoms; had thought was related to sinuses and has been taking sinus medication and has not helped; has had dizziness with position changes--laying down or bending over; has had some room spinning when lays down; has hum sound in ears; no fever; no malaise; no sore throat; no runny/stuffy nose; symptoms started a couple of months ago and getting worse.      The following portions of the patient's history were reviewed and updated as appropriate: allergies, current medications, past family history, past medical history, past social history, past surgical history and  problem list.    Current Outpatient Medications on File Prior to Visit   Medication Sig    escitalopram (LEXAPRO) 10 MG tablet Take 1 tablet by mouth Daily.    Omega-3 Fatty Acids (fish oil) 1000 MG capsule capsule Take 1 capsule by mouth Daily With Breakfast.    VITAMIN D PO Take 1 tablet by mouth Daily.    [DISCONTINUED] ibandronate (BONIVA) 150 MG tablet TAKE 1 TABLET BY MOUTH ONCE MONTHLY     No current facility-administered medications on file prior to visit.        Past Medical History:   Diagnosis Date    Acute appendicitis with localized peritonitis 12/24/2016    Anxiety     Pneumonia of right lower lobe due to infectious organism 03/14/2024       Past Surgical History:   Procedure Laterality Date    APPENDECTOMY N/A 12/24/2016    Procedure: APPENDECTOMY LAPAROSCOPIC;  Surgeon: Sebastian Hernandez Jr., MD;  Location: [facility] MAIN OR;  Service:     CHOLECYSTECTOMY N/A 1999    COLONOSCOPY N/A 04/13/2018    Procedure: COLONOSCOPY into cecum and TI with cold biopsy polypectomies;  Surgeon: Roselyn Leung MD;  Location: [facility] ENDOSCOPY;  Service: Gastroenterology    LASIK Bilateral 02/25/2022    WRIST FUSION Left 2021       Family History   Problem Relation Age of Onset    Colon cancer Mother 83        colon and rectal cancer    Hypertension Father     Heart disease Father     Heart attack Father 65        father passed in 2021 at age 81 years    Cancer Brother     Pancreatic cancer Brother     Cancer Brother     Lung cancer Brother     No Known Problems Daughter     No Known Problems Daughter     Malig Hyperthermia Neg Hx        Social History     Socioeconomic History    Marital status: Single   Tobacco Use    Smoking status: Never    Smokeless tobacco: Never   Vaping Use    Vaping status: Never Used   Substance and Sexual Activity    Alcohol use: Yes     Alcohol/week: 3.0 standard drinks of alcohol     Types: 3 Cans of beer per week     Comment: a few times a year    Drug use: Defer    Sexual activity: Defer      Birth control/protection: Post-menopausal       Review of Systems   Constitutional:  Positive for fatigue. Negative for appetite change, chills, fever and unexpected weight loss. Weight gain: has gained about 8 pounds in last 6 months since has not been eating as good and eating more at night.  HENT:  Negative for ear pain (ears feel full), postnasal drip and trouble swallowing. Sinus pressure: some in forehead.   Eyes:  Negative for blurred vision and discharge.   Respiratory:  Negative for cough, chest tightness and shortness of breath.    Cardiovascular:  Negative for chest pain, palpitations and leg swelling.   Gastrointestinal:  Negative for abdominal pain, blood in stool, constipation, diarrhea, GERD and indigestion.   Endocrine: Negative for cold intolerance, heat intolerance and polydipsia.   Genitourinary:  Negative for dysuria and frequency.   Musculoskeletal:  Negative for arthralgias and back pain (some in lower back at times, no concerns; no radiation of pain down legs).   Skin:  Negative for rash and skin lesions.   Neurological:  Positive for dizziness (see HPI). Negative for syncope, weakness and headache.   Hematological:  Does not bruise/bleed easily.       PHQ-9 Depression Screening  Little interest or pleasure in doing things? 3-->nearly every day   Feeling down, depressed, or hopeless? 3-->nearly every day   Trouble falling or staying asleep, or sleeping too much? 3-->nearly every day   Feeling tired or having little energy? 3-->nearly every day   Poor appetite or overeating? 0-->not at all   Feeling bad about yourself - or that you are a failure or have let yourself or your family down? 0-->not at all   Trouble concentrating on things, such as reading the newspaper or watching television? 3-->nearly every day   Moving or speaking so slowly that other people could have noticed? Or the opposite - being so fidgety or restless that you have been moving around a lot more than usual? 0-->not at all  "  Thoughts that you would be better off dead, or of hurting yourself in some way? 0-->not at all   PHQ-9 Total Score 15   If you checked off any problems, how difficult have these problems made it for you to do your work, take care of things at home, or get along with other people? not difficult at all     RUTH-7 anxiety score: 6    Objective   Vitals:    10/09/24 1415   BP: 114/78   BP Location: Left arm   Patient Position: Sitting   Cuff Size: Large Adult   Pulse: 68   Temp: 98.2 °F (36.8 °C)   TempSrc: Temporal   SpO2: 100%   Weight: 61.5 kg (135 lb 9.6 oz)   Height: 157.5 cm (62.01\")     Body mass index is 24.8 kg/m².    Physical Exam  Vitals and nursing note reviewed.   Constitutional:       General: She is not in acute distress.     Appearance: She is well-developed and well-groomed. She is not diaphoretic.   HENT:      Head: Normocephalic and atraumatic.      Jaw: No tenderness or pain on movement.      Right Ear: Tympanic membrane and external ear normal. No decreased hearing noted.      Left Ear: Tympanic membrane and external ear normal. No decreased hearing noted.      Nose: Nose normal.      Right Sinus: No maxillary sinus tenderness or frontal sinus tenderness.      Left Sinus: No maxillary sinus tenderness or frontal sinus tenderness.      Mouth/Throat:      Mouth: Mucous membranes are moist.      Pharynx: No oropharyngeal exudate or posterior oropharyngeal erythema.   Eyes:      Extraocular Movements: Extraocular movements intact.      Conjunctiva/sclera: Conjunctivae normal.      Pupils: Pupils are equal, round, and reactive to light.   Neck:      Thyroid: No thyromegaly.      Vascular: No carotid bruit.      Trachea: No tracheal deviation.   Cardiovascular:      Rate and Rhythm: Normal rate and regular rhythm.      Pulses: Normal pulses.      Heart sounds: Normal heart sounds. No murmur heard.  Pulmonary:      Effort: Pulmonary effort is normal. No respiratory distress.      Breath sounds: Normal " breath sounds.   Abdominal:      General: Bowel sounds are normal.      Palpations: Abdomen is soft. There is no hepatomegaly or splenomegaly.      Tenderness: There is no abdominal tenderness. There is no guarding.   Musculoskeletal:         General: Normal range of motion.      Cervical back: Normal range of motion and neck supple. No bony tenderness.      Thoracic back: No bony tenderness.      Lumbar back: No bony tenderness.      Right lower leg: No edema.      Left lower leg: No edema.   Lymphadenopathy:      Cervical: No cervical adenopathy.   Skin:     General: Skin is warm and dry.      Findings: No rash.   Neurological:      Mental Status: She is alert and oriented to person, place, and time.      Cranial Nerves: No cranial nerve deficit.      Motor: Motor function is intact.      Coordination: Coordination normal.      Gait: Gait normal.      Deep Tendon Reflexes: Reflexes are normal and symmetric.      Comments: Positive Deondre-Hallpike test on right, no nystagmus.   Psychiatric:         Mood and Affect: Mood normal.         Behavior: Behavior normal.         Thought Content: Thought content normal.         Cognition and Memory: Cognition normal.         Judgment: Judgment normal.         Lab Results   Component Value Date    WBC 8.51 03/04/2024    RBC 4.07 03/04/2024    HGB 11.7 (L) 03/04/2024    HCT 35.9 03/04/2024    MCV 88.2 03/04/2024    MCH 28.7 03/04/2024    MCHC 32.6 03/04/2024    RDW 12.3 03/04/2024    RDWSD 39.0 03/04/2024    MPV 9.8 03/04/2024     03/04/2024    NEUTRORELPCT 67.1 03/04/2024    LYMPHORELPCT 19.3 (L) 03/04/2024    MONORELPCT 11.8 03/04/2024    EOSRELPCT 0.9 03/04/2024    BASORELPCT 0.4 03/04/2024    AUTOIGPER 0.5 03/04/2024    NEUTROABS 5.72 03/04/2024    LYMPHSABS 1.64 03/04/2024    MONOSABS 1.00 (H) 03/04/2024    EOSABS 0.08 03/04/2024    BASOSABS 0.03 03/04/2024    AUTOIGNUM 0.04 03/04/2024    NRBC 0.0 03/04/2024     Lab Results   Component Value Date    GLUCOSE 93  03/04/2024    BUN 10 03/04/2024    CREATININE 0.69 03/04/2024    EGFRIFNONA 104 12/25/2016    BCR 14.5 03/04/2024    K 4.5 03/04/2024    CO2 27.0 03/04/2024    CALCIUM 9.3 03/04/2024    PROTENTOTREF 7.1 03/01/2022    ALBUMIN 4.2 03/04/2024    LABIL2 1.8 03/01/2022    AST 25 03/04/2024    ALT 40 (H) 03/04/2024      Lab Results   Component Value Date    CHLPL 214 (H) 03/01/2022    TRIG 45 03/01/2022     03/01/2022    VLDL 8 03/01/2022     (H) 03/01/2022     Lab Results   Component Value Date    TSH 1.970 03/01/2022     Lab Results   Component Value Date    RBCUA 3-5 (A) 12/24/2016    BACTERIA None Seen 12/24/2016    COLORU Yellow 12/24/2016    CLARITYU Clear 12/24/2016    LEUKOCYTESUR Negative 12/24/2016    GLUCOSEU Negative 12/24/2016    BLOODU Negative 12/24/2016    BILIRUBINUR Negative 12/24/2016    NITRITEU Negative 12/24/2016 9/26/24 CT chest: A patchy area of infiltrate within the right upper lobe seen on the previous examination has resolved in the interim. There is biapical scar formation identical to the previous examination.  Interval development of a 6 mm noncalcified nodule at the base of the right lower lobe on image #71. Six-month follow-up CT chest advised.   Micronodule demonstrated on the left major fissure on image #50 as  before. There are several sub-6 mm foci of nodularity within the left upper lobe, just anterior to the major fissure seen on images 23 through 34, unchanged. Limited images through the upper abdomen are  unremarkable.    Assessment    Problem List Items Addressed This Visit       Anxiety    Current Assessment & Plan     Not as well controlled.  Try taking Escitalopram 10 mg 2 tablets daily for total of 20 mg daily.  Call in 2 weeks regarding mood to adjust refill dose.         Lung nodule seen on imaging study    Overview     3/4/24 CT chest: Cluster of nodules in the posterior right upper lobe with largest nodule measuring about 1.3 cm. These may also be  infectious/inflammatory although could potentially reflect neoplasm. Short interval follow-up chest CT in about 1 month is recommended to ensure clearing of the pneumonia and evaluate for clearing of the right upper lobe nodules.   4/28/24 CT chest: Interval resolution of the right upper lobe nodule with only some minimal increased density seen remaining in this region.  2. Near complete resolution of the dense consolidation of the right lower lobe since the 03/04/2024 study.  3. Additional follow-up CT of the chest without contrast in approximately 4 months to 6 months recommended to assess stability and/or further resolution of these findings.  9/30/24 CT chest: Interval development of a 6 mm noncalcified nodule at the base of the right lower lobe on image #71. Six-month follow-up CT chest advised. 4. Micronodule demonstrated on the left major fissure on image #50 as before. There are several sub-6 mm foci of nodularity within the left upper lobe, just anterior to the major fissure seen on images 23 through 34, unchanged.         Acute non-recurrent frontal sinusitis    Current Assessment & Plan     Continue increased intake of clear liquids and rest.         Relevant Medications    amoxicillin (AMOXIL) 875 MG tablet    Benign paroxysmal positional vertigo of right ear    Current Assessment & Plan     Change positions slowly.  Continue increased intake of clear liquids and rest.  Do not take Meclizine if need to be alert.         Relevant Medications    meclizine (ANTIVERT) 25 MG tablet    Osteoporosis    Current Assessment & Plan     Continue Ibandronate monthly.         Relevant Medications    ibandronate (BONIVA) 150 MG tablet    Other Relevant Orders    Vitamin D,25-Hydroxy    Fatigue    Relevant Orders    CBC & Differential    Comprehensive Metabolic Panel    Vitamin B12 & Folate    Vitamin D,25-Hydroxy    TSH Rfx On Abnormal To Free T4    Anemia    Relevant Orders    Iron    Ferritin    CBC & Differential     Vitamin B12 & Folate    History of colon polyps    Relevant Orders    Ambulatory Referral to Gastroenterology    Moderate episode of recurrent major depressive disorder    Current Assessment & Plan     Patient's depression is a recurrent episode that is moderate without psychosis. Depression is active and  not as well controlled .    Plan:   Continue current medication therapy   Try taking Escitalopram 10 mg 2 tablets daily for total of 20 mg daily.  Call in 2 weeks regarding refill dose.  Followup in 2 weeks.           Other Visit Diagnoses       Encounter for annual physical exam    -  Primary    Relevant Orders    Tdap Vaccine Greater Than or Equal To 8yo IM (Completed)    Iron    Ferritin    CBC & Differential    Comprehensive Metabolic Panel    Vitamin B12 & Folate    Vitamin D,25-Hydroxy    Lipid Panel With LDL / HDL Ratio    TSH Rfx On Abnormal To Free T4    Encounter for immunization        Relevant Orders    Tdap Vaccine Greater Than or Equal To 8yo IM (Completed)    Encounter for lipid screening for cardiovascular disease        Relevant Orders    Lipid Panel With LDL / HDL Ratio    Need for hepatitis C screening test        Relevant Orders    Hepatitis C Antibody               Return in about 6 months (around 4/9/2025) for Recheck.or sooner if symptoms persist or worsen.  Impression: Health maintenance visit.  Currently, eats a pretty healthy diet and has a regular exercise routine.  Cervical cancer screening: UTD per GYN .  Breast cancer screening: UTD per GYN, will request results.  Bone density screening: UTD per GYN.  Colorectal cancer screening: due for repeat colonoscopy.  Screening lab work includes: CMP, lipid.  Immunizations: needs Tdap today, will check with insurance regarding Shingrix; risks and benefits of immunizations were discussed with patient.  Advice and education were given regarding nutrition and aerobic exercise.     Patient's daughter is physical therapist and pt will check with  daughter regarding vestibular therapy if dizziness persists after treatment of sinusitis.

## 2024-10-10 LAB
25(OH)D3+25(OH)D2 SERPL-MCNC: 53.4 NG/ML (ref 30–100)
ALBUMIN SERPL-MCNC: 4.4 G/DL (ref 3.8–4.9)
ALP SERPL-CCNC: 77 IU/L (ref 44–121)
ALT SERPL-CCNC: 33 IU/L (ref 0–32)
AST SERPL-CCNC: 34 IU/L (ref 0–40)
BASOPHILS # BLD AUTO: 0.1 X10E3/UL (ref 0–0.2)
BASOPHILS NFR BLD AUTO: 1 %
BILIRUB SERPL-MCNC: 0.4 MG/DL (ref 0–1.2)
BUN SERPL-MCNC: 15 MG/DL (ref 6–24)
BUN/CREAT SERPL: 19 (ref 9–23)
CALCIUM SERPL-MCNC: 9.5 MG/DL (ref 8.7–10.2)
CHLORIDE SERPL-SCNC: 105 MMOL/L (ref 96–106)
CHOLEST SERPL-MCNC: 189 MG/DL (ref 100–199)
CO2 SERPL-SCNC: 25 MMOL/L (ref 20–29)
CREAT SERPL-MCNC: 0.77 MG/DL (ref 0.57–1)
EGFRCR SERPLBLD CKD-EPI 2021: 89 ML/MIN/1.73
EOSINOPHIL # BLD AUTO: 0.2 X10E3/UL (ref 0–0.4)
EOSINOPHIL NFR BLD AUTO: 3 %
ERYTHROCYTE [DISTWIDTH] IN BLOOD BY AUTOMATED COUNT: 12 % (ref 11.7–15.4)
FERRITIN SERPL-MCNC: 98 NG/ML (ref 15–150)
FOLATE SERPL-MCNC: 16 NG/ML
GLOBULIN SER CALC-MCNC: 2.3 G/DL (ref 1.5–4.5)
GLUCOSE SERPL-MCNC: 89 MG/DL (ref 70–99)
HCT VFR BLD AUTO: 38.9 % (ref 34–46.6)
HCV IGG SERPL QL IA: NON REACTIVE
HDLC SERPL-MCNC: 77 MG/DL
HGB BLD-MCNC: 12.6 G/DL (ref 11.1–15.9)
IMM GRANULOCYTES # BLD AUTO: 0 X10E3/UL (ref 0–0.1)
IMM GRANULOCYTES NFR BLD AUTO: 0 %
IRON SERPL-MCNC: 79 UG/DL (ref 27–159)
LDLC SERPL CALC-MCNC: 96 MG/DL (ref 0–99)
LDLC/HDLC SERPL: 1.2 RATIO (ref 0–3.2)
LYMPHOCYTES # BLD AUTO: 1.9 X10E3/UL (ref 0.7–3.1)
LYMPHOCYTES NFR BLD AUTO: 35 %
MCH RBC QN AUTO: 30.6 PG (ref 26.6–33)
MCHC RBC AUTO-ENTMCNC: 32.4 G/DL (ref 31.5–35.7)
MCV RBC AUTO: 94 FL (ref 79–97)
MONOCYTES # BLD AUTO: 0.5 X10E3/UL (ref 0.1–0.9)
MONOCYTES NFR BLD AUTO: 9 %
NEUTROPHILS # BLD AUTO: 2.9 X10E3/UL (ref 1.4–7)
NEUTROPHILS NFR BLD AUTO: 52 %
PLATELET # BLD AUTO: 273 X10E3/UL (ref 150–450)
POTASSIUM SERPL-SCNC: 4.9 MMOL/L (ref 3.5–5.2)
PROT SERPL-MCNC: 6.7 G/DL (ref 6–8.5)
RBC # BLD AUTO: 4.12 X10E6/UL (ref 3.77–5.28)
SODIUM SERPL-SCNC: 144 MMOL/L (ref 134–144)
TRIGL SERPL-MCNC: 87 MG/DL (ref 0–149)
TSH SERPL DL<=0.005 MIU/L-ACNC: 1.38 UIU/ML (ref 0.45–4.5)
VIT B12 SERPL-MCNC: 325 PG/ML (ref 232–1245)
VLDLC SERPL CALC-MCNC: 16 MG/DL (ref 5–40)
WBC # BLD AUTO: 5.5 X10E3/UL (ref 3.4–10.8)

## 2024-10-10 NOTE — ASSESSMENT & PLAN NOTE
Not as well controlled.  Try taking Escitalopram 10 mg 2 tablets daily for total of 20 mg daily.  Call in 2 weeks regarding mood to adjust refill dose.

## 2024-10-10 NOTE — ASSESSMENT & PLAN NOTE
Change positions slowly.  Continue increased intake of clear liquids and rest.  Do not take Meclizine if need to be alert.

## 2024-10-10 NOTE — ASSESSMENT & PLAN NOTE
Patient's depression is a recurrent episode that is moderate without psychosis. Depression is active and  not as well controlled .    Plan:   Continue current medication therapy   Try taking Escitalopram 10 mg 2 tablets daily for total of 20 mg daily.  Call in 2 weeks regarding refill dose.  Followup in 2 weeks.

## 2024-11-05 ENCOUNTER — TELEPHONE (OUTPATIENT)
Dept: GASTROENTEROLOGY | Facility: CLINIC | Age: 59
End: 2024-11-05
Payer: COMMERCIAL

## 2024-11-05 NOTE — TELEPHONE ENCOUNTER
LAST C/S 4/13/18    IN EPIC     PERSONAL HX OF POLYPS    NO FAMILY HX OF POLYPS     FAMILY HX OF COLON CA    NO ASA OR BLOOD THINNERS              NO  MEDICATIONS        OA QUESTIONNAIRE SCANNED IN MEDIA

## 2024-11-06 ENCOUNTER — PREP FOR SURGERY (OUTPATIENT)
Dept: OTHER | Facility: HOSPITAL | Age: 59
End: 2024-11-06
Payer: COMMERCIAL

## 2024-11-06 DIAGNOSIS — Z86.0101 HISTORY OF ADENOMATOUS POLYP OF COLON: Primary | ICD-10-CM

## 2024-11-11 ENCOUNTER — TELEPHONE (OUTPATIENT)
Dept: GASTROENTEROLOGY | Facility: CLINIC | Age: 59
End: 2024-11-11
Payer: COMMERCIAL

## 2024-11-11 NOTE — TELEPHONE ENCOUNTER
Hub staff attempted to follow warm transfer process and was unsuccessful     Caller: Twila Allison    Relationship to patient: Self    Best call back number: [phone]    Patient is needing: PT IS RETURNING A MISSED CALL TO SCHEDULE HER SCOPE. PLEASE CONTACT PT TO ASSIST. BEST TIME TO REACH PT IS ANYTIME.

## 2024-11-11 NOTE — TELEPHONE ENCOUNTER
BRENNEN - FOR PSC WITH PROVIDER - [facility] WILL CALL WITH ARRIVE TIME A WEEK PRIOR - DATE IS COLON   12/19/2024

## 2024-11-11 NOTE — TELEPHONE ENCOUNTER
Hub staff attempted to follow warm transfer process and was unsuccessful     Caller: Twila Allison    Relationship to patient: Self    Best call back number: [phone]    Patient is needing: PT IS RETURNING A MISSED CALL TO SCHEDULE HER SCOPE. PLEASE CONTACT PT TO ASSIST. BEST TIME TO REACH PT IS ANYTIME.

## 2024-11-12 NOTE — TELEPHONE ENCOUNTER
Hub staff attempted to follow warm transfer process and was unsuccessful     Caller: Twila Allison    Relationship to patient: Self    Best call back number: [phone]     Patient is needing: PT WAS RETURNING CALL. PLEASE CALL BACK AND ADVISE

## 2024-12-19 ENCOUNTER — OUTSIDE FACILITY SERVICE (OUTPATIENT)
Dept: GASTROENTEROLOGY | Facility: CLINIC | Age: 59
End: 2024-12-19
Payer: COMMERCIAL

## (undated) DEVICE — Device: Brand: DEFENDO AIR/WATER/SUCTION AND BIOPSY VALVE

## (undated) DEVICE — CANN NASL CO2 TRULINK W/O2 A/

## (undated) DEVICE — THE TORRENT IRRIGATION SCOPE CONNECTOR IS USED WITH THE TORRENT IRRIGATION TUBING TO PROVIDE IRRIGATION FLUIDS SUCH AS STERILE WATER DURING GASTROINTESTINAL ENDOSCOPIC PROCEDURES WHEN USED IN CONJUNCTION WITH AN IRRIGATION PUMP (OR ELECTROSURGICAL UNIT).: Brand: TORRENT

## (undated) DEVICE — SINGLE-USE BIOPSY FORCEPS: Brand: RADIAL JAW 4

## (undated) DEVICE — TUBING, SUCTION, 1/4" X 10', STRAIGHT: Brand: MEDLINE